# Patient Record
Sex: MALE | Race: WHITE | Employment: OTHER | URBAN - METROPOLITAN AREA
[De-identification: names, ages, dates, MRNs, and addresses within clinical notes are randomized per-mention and may not be internally consistent; named-entity substitution may affect disease eponyms.]

---

## 2017-01-31 PROBLEM — Z47.89 AFTERCARE FOLLOWING SURGERY OF THE MUSCULOSKELETAL SYSTEM: Status: ACTIVE | Noted: 2017-01-31

## 2017-04-07 PROCEDURE — 88108 CYTOPATH CONCENTRATE TECH: CPT | Performed by: UROLOGY

## 2017-06-27 PROBLEM — Z47.89 AFTERCARE FOLLOWING SURGERY OF THE MUSCULOSKELETAL SYSTEM: Status: ACTIVE | Noted: 2017-06-27

## 2017-06-27 PROBLEM — Z47.89 AFTERCARE FOLLOWING SURGERY OF THE MUSCULOSKELETAL SYSTEM: Status: RESOLVED | Noted: 2017-01-31 | Resolved: 2017-06-27

## 2017-08-10 PROBLEM — Z79.01 LONG TERM (CURRENT) USE OF ANTICOAGULANTS: Status: ACTIVE | Noted: 2017-08-10

## 2017-10-27 PROCEDURE — 88108 CYTOPATH CONCENTRATE TECH: CPT | Performed by: UROLOGY

## 2018-03-01 PROBLEM — I25.5 ISCHEMIC CARDIOMYOPATHY: Status: ACTIVE | Noted: 2018-03-01

## 2018-03-01 PROBLEM — Z47.89 AFTERCARE FOLLOWING SURGERY OF THE MUSCULOSKELETAL SYSTEM: Status: RESOLVED | Noted: 2017-06-27 | Resolved: 2018-03-01

## 2018-03-01 PROBLEM — G62.9 PERIPHERAL POLYNEUROPATHY: Status: ACTIVE | Noted: 2018-03-01

## 2018-03-01 PROBLEM — Z86.73 HISTORY OF CVA IN ADULTHOOD: Status: ACTIVE | Noted: 2018-03-01

## 2018-03-01 PROBLEM — J98.2 INTERSTITIAL EMPHYSEMA (HCC): Status: ACTIVE | Noted: 2018-03-01

## 2018-03-01 PROBLEM — I25.5 ISCHEMIC CARDIOMYOPATHY: Status: RESOLVED | Noted: 2018-03-01 | Resolved: 2018-03-01

## 2018-06-01 PROBLEM — I34.0 NON-RHEUMATIC MITRAL REGURGITATION: Status: ACTIVE | Noted: 2018-06-01

## 2018-06-29 PROBLEM — R31.0 GROSS HEMATURIA: Status: ACTIVE | Noted: 2018-06-29

## 2018-08-02 PROBLEM — I47.2 VT (VENTRICULAR TACHYCARDIA) (HCC): Status: ACTIVE | Noted: 2018-08-02

## 2018-08-02 PROBLEM — I47.20 VT (VENTRICULAR TACHYCARDIA): Status: ACTIVE | Noted: 2018-08-02

## 2018-10-22 PROBLEM — L97.909 ATHEROSCLEROSIS OF ARTERY OF EXTREMITY WITH ULCERATION (HCC): Status: ACTIVE | Noted: 2018-10-22

## 2018-10-22 PROBLEM — I70.299 ATHEROSCLEROSIS OF ARTERY OF EXTREMITY WITH ULCERATION (HCC): Status: ACTIVE | Noted: 2018-10-22

## 2019-07-19 PROBLEM — I77.9 BILATERAL CAROTID ARTERY DISEASE (HCC): Status: ACTIVE | Noted: 2019-07-19

## 2019-07-24 PROBLEM — R35.1 BENIGN PROSTATIC HYPERPLASIA WITH NOCTURIA: Status: ACTIVE | Noted: 2019-07-24

## 2019-07-24 PROBLEM — N40.1 BENIGN PROSTATIC HYPERPLASIA WITH NOCTURIA: Status: ACTIVE | Noted: 2019-07-24

## 2019-07-24 PROBLEM — Z89.421 ACQUIRED ABSENCE OF OTHER RIGHT TOE(S) (HCC): Status: ACTIVE | Noted: 2019-07-24

## 2019-07-24 PROBLEM — R31.0 GROSS HEMATURIA: Status: RESOLVED | Noted: 2018-06-29 | Resolved: 2019-07-24

## 2019-09-04 PROBLEM — Z47.89 AFTERCARE FOLLOWING SURGERY OF THE MUSCULOSKELETAL SYSTEM, NEC: Status: ACTIVE | Noted: 2019-09-04

## 2020-01-27 PROBLEM — H25.9 AGE-RELATED CATARACT OF BOTH EYES, UNSPECIFIED AGE-RELATED CATARACT TYPE: Status: ACTIVE | Noted: 2020-01-27

## 2021-06-02 PROBLEM — Z89.421: Status: ACTIVE | Noted: 2019-07-24

## 2021-06-02 PROBLEM — I65.23 BILATERAL CAROTID ARTERY STENOSIS: Status: ACTIVE | Noted: 2019-07-19

## 2021-06-02 PROBLEM — D68.69 SECONDARY HYPERCOAGULABLE STATE (HCC): Status: ACTIVE | Noted: 2021-06-02

## 2021-06-28 RX ORDER — WARFARIN SODIUM 7.5 MG/1
7.5 TABLET ORAL AS DIRECTED
COMMUNITY
End: 2022-01-04

## 2021-07-01 ENCOUNTER — HOSPITAL ENCOUNTER (OUTPATIENT)
Dept: INTERVENTIONAL RADIOLOGY/VASCULAR | Facility: HOSPITAL | Age: 81
Discharge: HOME | End: 2021-07-01
Attending: SURGERY | Admitting: SURGERY
Payer: MEDICARE

## 2021-07-01 VITALS
HEART RATE: 69 BPM | WEIGHT: 175 LBS | TEMPERATURE: 98 F | DIASTOLIC BLOOD PRESSURE: 61 MMHG | BODY MASS INDEX: 23.19 KG/M2 | OXYGEN SATURATION: 99 % | RESPIRATION RATE: 19 BRPM | SYSTOLIC BLOOD PRESSURE: 105 MMHG | HEIGHT: 73 IN

## 2021-07-01 DIAGNOSIS — I70.244: ICD-10-CM

## 2021-07-01 DIAGNOSIS — I70.234: ICD-10-CM

## 2021-07-01 LAB — INR: 1.2 (ref 0.8–1.3)

## 2021-07-01 PROCEDURE — 75710 ARTERY X-RAYS ARM/LEG: CPT

## 2021-07-01 PROCEDURE — 36246 INS CATH ABD/L-EXT ART 2ND: CPT

## 2021-07-01 PROCEDURE — 99152 MOD SED SAME PHYS/QHP 5/>YRS: CPT

## 2021-07-01 PROCEDURE — B41F1ZZ FLUOROSCOPY OF RIGHT LOWER EXTREMITY ARTERIES USING LOW OSMOLAR CONTRAST: ICD-10-PCS | Performed by: SURGERY

## 2021-07-01 PROCEDURE — 99153 MOD SED SAME PHYS/QHP EA: CPT

## 2021-07-01 PROCEDURE — 85610 PROTHROMBIN TIME: CPT

## 2021-07-01 PROCEDURE — 3E053KZ INTRODUCTION OF OTHER DIAGNOSTIC SUBSTANCE INTO PERIPHERAL ARTERY, PERCUTANEOUS APPROACH: ICD-10-PCS | Performed by: SURGERY

## 2021-07-01 RX ORDER — ASPIRIN 81 MG/1
324 TABLET, CHEWABLE ORAL DAILY
Status: DISCONTINUED | OUTPATIENT
Start: 2021-07-01 | End: 2021-07-01

## 2021-07-01 RX ORDER — MIDAZOLAM HYDROCHLORIDE 1 MG/ML
INJECTION INTRAMUSCULAR; INTRAVENOUS
Status: COMPLETED
Start: 2021-07-01 | End: 2021-07-01

## 2021-07-01 RX ORDER — ASPIRIN 81 MG/1
TABLET, CHEWABLE ORAL
Status: COMPLETED
Start: 2021-07-01 | End: 2021-07-01

## 2021-07-01 RX ORDER — ONDANSETRON 4 MG/1
4 TABLET, ORALLY DISINTEGRATING ORAL EVERY 6 HOURS PRN
Status: DISCONTINUED | OUTPATIENT
Start: 2021-07-01 | End: 2021-07-01

## 2021-07-01 RX ORDER — HEPARIN SODIUM 5000 [USP'U]/ML
INJECTION, SOLUTION INTRAVENOUS; SUBCUTANEOUS
Status: COMPLETED
Start: 2021-07-01 | End: 2021-07-01

## 2021-07-01 RX ORDER — ONDANSETRON 2 MG/ML
4 INJECTION INTRAMUSCULAR; INTRAVENOUS EVERY 6 HOURS PRN
Status: DISCONTINUED | OUTPATIENT
Start: 2021-07-01 | End: 2021-07-01

## 2021-07-01 RX ORDER — ONDANSETRON 2 MG/ML
INJECTION INTRAMUSCULAR; INTRAVENOUS
Status: COMPLETED
Start: 2021-07-01 | End: 2021-07-01

## 2021-07-01 RX ORDER — SODIUM CHLORIDE 9 MG/ML
INJECTION, SOLUTION INTRAVENOUS CONTINUOUS
Status: DISCONTINUED | OUTPATIENT
Start: 2021-07-01 | End: 2021-07-01

## 2021-07-01 RX ORDER — LIDOCAINE HYDROCHLORIDE 10 MG/ML
INJECTION, SOLUTION EPIDURAL; INFILTRATION; INTRACAUDAL; PERINEURAL
Status: COMPLETED
Start: 2021-07-01 | End: 2021-07-01

## 2021-07-01 RX ADMIN — SODIUM CHLORIDE: 9 INJECTION, SOLUTION INTRAVENOUS at 10:00:00

## 2021-07-01 RX ADMIN — ASPIRIN 324 MG: 81 TABLET, CHEWABLE ORAL at 10:00:00

## 2021-07-01 NOTE — PROGRESS NOTES
Pt is able to sit up and ambulate without difficulty. Pt's vital signs are stable. Procedural access site is dry and intact with no signs and symptoms of bleeding or hematoma. Pt tolerated food/fluids. Dr. Shireen Retana spoke to pt/family post procedure.  Instruct

## 2021-07-01 NOTE — H&P
[facility]  Vascular Surgery Consultation    Lopez Raiza Patient Status:  Outpatient in a Bed    1940 MRN [id number]   Location [address] [facility] Attending Maya Trujillo MD   Hosp Day # 0 PCP Eliceo Gross MD Location: [address] [place] [address]   • INCISION FLEX TOE TENDON Left 5/12/2014    Procedure: ABDUCTOR HALLUCIS BREVIS TENDON TRANSFER/LENGTHENING;  Surgeon: Bogdan Flannery DPM;  Location: [facility] (NOT [facility]) [facility]   • REPAIR OF HAMMERTOE,ONE Left 5/12/2014    Procedure: METATARSAL HEAD RESECTION;  Surgeon: Linda Avery DPM;  Location: [address] [place] [address] Left 5/12/2014    Procedure: ARTHROPLASTY ONE (1) TOE;  Surgeon: Anthony Magana murmur, no gallop   ABDOMEN: soft, non-tender with no palpable aneurysm or masses  BACK: normal, no tenderness  SKIN: no rashes, no suspicious lesions, warm and dry  EXTREMITIES: no edema, full range of motion, no tenderness  NEURO/PSYCH: orientated x3, no

## 2021-07-01 NOTE — BRIEF OP NOTE
Pre-Operative Diagnosis: Atherosclerosis with ulcer right foot     Post-Operative Diagnosis: same     Procedure Performed:   1. US perc access left common femoral artery  2.  Selection of right common femoral artery and angiogram right leg    Anes:   2 V 25

## 2021-07-02 NOTE — PROGRESS NOTES
BETO roman, NPO after midnight, take only carvedilol, digoxin and entresto the morning of surgery with a tiny sip of water. Last dose of coumadin was several days ago because he had an angiogram yesterday on 7-1.   Instructed to continue to hold coumadin for

## 2021-07-03 NOTE — PROCEDURES
[facility]    PATIENT'S NAME: Hemalatha Barron   ATTENDING PHYSICIAN: Lindsey Castillo M.D. OPERATING PHYSICIAN: Lindsey Castillo M.D.    PATIENT ACCOUNT#:   [de-identified]    LOCATION:  [place] [address] ED  MEDICAL RECORD #:   [id number] diagnostic angiogram to attempt to plan revascularization. DETAILS OF PROCEDURE:  Patient was taken to the catheterization lab, prepped and draped in a sterile fashion.   Moderate conscious sedation was initiated and monitored by a qualified nurse under 07:27:44  t: 07/02/2021 08:08:27  [facility] [id number]  [facility]/

## 2021-07-05 ENCOUNTER — ANESTHESIA EVENT (OUTPATIENT)
Dept: CARDIAC SURGERY | Facility: HOSPITAL | Age: 81
DRG: 253 | End: 2021-07-05
Payer: MEDICARE

## 2021-07-05 NOTE — ANESTHESIA PREPROCEDURE EVALUATION
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  TAKE 1 TABLET DAILY ), Disp: 90 tablet, Rfl: 3, 7/6/2021 at Unknown time  carvedilol 25 MG Oral Tab, Take 1 tablet (25 mg total) by mouth 2 (two) History:   Procedure Laterality Date   • ADJ TISS XFER HEAD,FAC,HAND <10SQCM Left 5/12/2014    Procedure: TENOTOMY AND CAPSULOTOMY FOOT;  Surgeon: Pam Gonzalez DPM;  Location: [address] [place] [address]   • ADJ TISS XFER HEAD,FAC,HAND <10SQCM Left 5/12/2 ANTIBIOTIC SURGICAL SITE INFECT Left 5/12/2014    Procedure: TENOTOMY AND CAPSULOTOMY FOOT;  Surgeon: Mckinley Lewis DPM;  Location: [address] [place] [address]   • PATIENT WITH PREOPERATIVE ORDER FOR IV ANTIBIOTIC SURGICAL SITE INFECT Right 2/22/2016    Pr Cardiovascular    Cardiovascular exam normal.  Rhythm: regular  Rate: normal  (+) murmur   Dental    No notable dental history. Pulmonary    Pulmonary exam normal.  Breath sounds clear to auscultation bilaterally.                Other findings

## 2021-07-06 ENCOUNTER — HOSPITAL ENCOUNTER (INPATIENT)
Facility: HOSPITAL | Age: 81
LOS: 6 days | Discharge: HOME | DRG: 253 | End: 2021-07-12
Attending: SURGERY | Admitting: SURGERY
Payer: MEDICARE

## 2021-07-06 ENCOUNTER — ANESTHESIA (OUTPATIENT)
Dept: CARDIAC SURGERY | Facility: HOSPITAL | Age: 81
DRG: 253 | End: 2021-07-06
Payer: MEDICARE

## 2021-07-06 LAB
ANION GAP SERPL CALC-SCNC: 6 MMOL/L (ref 0–18)
ANTIBODY SCREEN: NEGATIVE
APTT PPP: 36 SECONDS (ref 25.4–36.1)
APTT PPP: 36.6 SECONDS (ref 25.4–36.1)
ATRIAL RATE: 97 BPM
BUN BLD-MCNC: 14 MG/DL (ref 7–18)
BUN/CREAT SERPL: 15.6 (ref 10–20)
CALCIUM BLD-MCNC: 7.7 MG/DL (ref 8.5–10.1)
CHLORIDE SERPL-SCNC: 112 MMOL/L (ref 98–112)
CO2 SERPL-SCNC: 22 MMOL/L (ref 21–32)
CREAT BLD-MCNC: 0.9 MG/DL
DEPRECATED RDW RBC AUTO: 46.7 FL (ref 35.1–46.3)
ERYTHROCYTE [DISTWIDTH] IN BLOOD BY AUTOMATED COUNT: 14.1 % (ref 11–15)
GLUCOSE BLD-MCNC: 172 MG/DL (ref 70–99)
GLUCOSE BLD-MCNC: 176 MG/DL (ref 70–99)
GLUCOSE BLD-MCNC: 99 MG/DL (ref 70–99)
HAV IGM SER QL: 1.9 MG/DL (ref 1.6–2.6)
HCT VFR BLD AUTO: 41.2 %
HGB BLD-MCNC: 13.4 G/DL
INR BLD: 1.11 (ref 0.89–1.11)
INR BLD: 1.17 (ref 0.89–1.11)
ISTAT ACTIVATED CLOTTING TIME: 125 SECONDS (ref 74–137)
ISTAT BLOOD GAS BASE EXCESS: -1 MMOL/L
ISTAT BLOOD GAS BASE EXCESS: -2 MMOL/L
ISTAT BLOOD GAS HCO3: 23.4 MEQ/L
ISTAT BLOOD GAS HCO3: 24.6 MEQ/L
ISTAT BLOOD GAS O2 SATURATION: 100 %
ISTAT BLOOD GAS O2 SATURATION: 100 %
ISTAT BLOOD GAS PCO2: 41.6 MMHG
ISTAT BLOOD GAS PCO2: 42.1 MMHG
ISTAT BLOOD GAS PH: 7.36
ISTAT BLOOD GAS PH: 7.38
ISTAT BLOOD GAS PO2: 233 MMHG
ISTAT BLOOD GAS PO2: 314 MMHG
ISTAT BLOOD GAS TCO2: 25 MMOL/L (ref 22–32)
ISTAT BLOOD GAS TCO2: 26 MMOL/L (ref 22–32)
ISTAT HEMATOCRIT: 34 %
ISTAT HEMATOCRIT: 42 %
ISTAT IONIZED CALCIUM: 1.11 MMOL/L (ref 1.12–1.32)
ISTAT IONIZED CALCIUM: 1.13 MMOL/L (ref 1.12–1.32)
ISTAT POTASSIUM: 3.5 MMOL/L (ref 3.6–5.1)
ISTAT POTASSIUM: 4.5 MMOL/L (ref 3.6–5.1)
ISTAT SODIUM: 140 MMOL/L (ref 136–145)
ISTAT SODIUM: 142 MMOL/L (ref 136–145)
MCH RBC QN AUTO: 29.3 PG (ref 26–34)
MCHC RBC AUTO-ENTMCNC: 32.5 G/DL (ref 31–37)
MCV RBC AUTO: 90.2 FL
OSMOLALITY SERPL CALC.SUM OF ELEC: 295 MOSM/KG (ref 275–295)
PLATELET # BLD AUTO: 224 10(3)UL (ref 150–450)
POTASSIUM SERPL-SCNC: 4.1 MMOL/L (ref 3.5–5.1)
PSA SERPL DL<=0.01 NG/ML-MCNC: 14.6 SECONDS (ref 12.4–14.6)
PSA SERPL DL<=0.01 NG/ML-MCNC: 15.3 SECONDS (ref 12.4–14.6)
Q-T INTERVAL: 522 MS
QRS DURATION: 212 MS
QTC CALCULATION (BEZET): 567 MS
R AXIS: 227 DEGREES
RBC # BLD AUTO: 4.57 X10(6)UL
RH BLOOD TYPE: POSITIVE
SODIUM SERPL-SCNC: 140 MMOL/L (ref 136–145)
T AXIS: 63 DEGREES
TSI SER-ACNC: 0.64 MIU/ML (ref 0.36–3.74)
VENTRICULAR RATE: 71 BPM
WBC # BLD AUTO: 10.7 X10(3) UL (ref 4–11)

## 2021-07-06 PROCEDURE — 86920 COMPATIBILITY TEST SPIN: CPT

## 2021-07-06 PROCEDURE — 85347 COAGULATION TIME ACTIVATED: CPT

## 2021-07-06 PROCEDURE — 80048 BASIC METABOLIC PNL TOTAL CA: CPT | Performed by: INTERNAL MEDICINE

## 2021-07-06 PROCEDURE — 82330 ASSAY OF CALCIUM: CPT

## 2021-07-06 PROCEDURE — 93010 ELECTROCARDIOGRAM REPORT: CPT | Performed by: INTERNAL MEDICINE

## 2021-07-06 PROCEDURE — 84132 ASSAY OF SERUM POTASSIUM: CPT

## 2021-07-06 PROCEDURE — 85730 THROMBOPLASTIN TIME PARTIAL: CPT | Performed by: SURGERY

## 2021-07-06 PROCEDURE — 85610 PROTHROMBIN TIME: CPT | Performed by: SURGERY

## 2021-07-06 PROCEDURE — 85014 HEMATOCRIT: CPT

## 2021-07-06 PROCEDURE — 86900 BLOOD TYPING SEROLOGIC ABO: CPT | Performed by: SURGERY

## 2021-07-06 PROCEDURE — 85027 COMPLETE CBC AUTOMATED: CPT | Performed by: SURGERY

## 2021-07-06 PROCEDURE — 84295 ASSAY OF SERUM SODIUM: CPT

## 2021-07-06 PROCEDURE — 86901 BLOOD TYPING SEROLOGIC RH(D): CPT | Performed by: SURGERY

## 2021-07-06 PROCEDURE — 83735 ASSAY OF MAGNESIUM: CPT | Performed by: INTERNAL MEDICINE

## 2021-07-06 PROCEDURE — 86850 RBC ANTIBODY SCREEN: CPT | Performed by: SURGERY

## 2021-07-06 PROCEDURE — 041K09M BYPASS RIGHT FEMORAL ARTERY TO PERONEAL ARTERY WITH AUTOLOGOUS VENOUS TISSUE, OPEN APPROACH: ICD-10-PCS | Performed by: SURGERY

## 2021-07-06 PROCEDURE — 93005 ELECTROCARDIOGRAM TRACING: CPT

## 2021-07-06 PROCEDURE — 84443 ASSAY THYROID STIM HORMONE: CPT | Performed by: INTERNAL MEDICINE

## 2021-07-06 PROCEDURE — 06BQ0ZZ EXCISION OF LEFT SAPHENOUS VEIN, OPEN APPROACH: ICD-10-PCS | Performed by: SURGERY

## 2021-07-06 PROCEDURE — 3E033XZ INTRODUCTION OF VASOPRESSOR INTO PERIPHERAL VEIN, PERCUTANEOUS APPROACH: ICD-10-PCS | Performed by: INTERNAL MEDICINE

## 2021-07-06 PROCEDURE — 82803 BLOOD GASES ANY COMBINATION: CPT

## 2021-07-06 RX ORDER — NALOXONE HYDROCHLORIDE 0.4 MG/ML
80 INJECTION, SOLUTION INTRAMUSCULAR; INTRAVENOUS; SUBCUTANEOUS AS NEEDED
Status: ACTIVE | OUTPATIENT
Start: 2021-07-06 | End: 2021-07-06

## 2021-07-06 RX ORDER — DEXAMETHASONE SODIUM PHOSPHATE 4 MG/ML
VIAL (ML) INJECTION AS NEEDED
Status: DISCONTINUED | OUTPATIENT
Start: 2021-07-06 | End: 2021-07-06 | Stop reason: SURG

## 2021-07-06 RX ORDER — HYDROMORPHONE HYDROCHLORIDE 1 MG/ML
0.2 INJECTION, SOLUTION INTRAMUSCULAR; INTRAVENOUS; SUBCUTANEOUS EVERY 2 HOUR PRN
Status: DISCONTINUED | OUTPATIENT
Start: 2021-07-06 | End: 2021-07-12

## 2021-07-06 RX ORDER — ASPIRIN 81 MG/1
81 TABLET ORAL DAILY
Status: DISCONTINUED | OUTPATIENT
Start: 2021-07-06 | End: 2021-07-12

## 2021-07-06 RX ORDER — PHENYLEPHRINE HCL IN 0.9% NACL 50MG/250ML
PLASTIC BAG, INJECTION (ML) INTRAVENOUS CONTINUOUS
Status: DISCONTINUED | OUTPATIENT
Start: 2021-07-06 | End: 2021-07-09

## 2021-07-06 RX ORDER — FAMOTIDINE 20 MG/1
20 TABLET ORAL 2 TIMES DAILY
Status: DISCONTINUED | OUTPATIENT
Start: 2021-07-06 | End: 2021-07-12

## 2021-07-06 RX ORDER — CEFAZOLIN SODIUM/WATER 2 G/20 ML
2 SYRINGE (ML) INTRAVENOUS EVERY 8 HOURS
Status: COMPLETED | OUTPATIENT
Start: 2021-07-06 | End: 2021-07-07

## 2021-07-06 RX ORDER — ONDANSETRON 2 MG/ML
4 INJECTION INTRAMUSCULAR; INTRAVENOUS EVERY 6 HOURS PRN
Status: DISCONTINUED | OUTPATIENT
Start: 2021-07-06 | End: 2021-07-12

## 2021-07-06 RX ORDER — NEOSTIGMINE METHYLSULFATE 1 MG/ML
INJECTION INTRAVENOUS AS NEEDED
Status: DISCONTINUED | OUTPATIENT
Start: 2021-07-06 | End: 2021-07-06 | Stop reason: SURG

## 2021-07-06 RX ORDER — ONDANSETRON 2 MG/ML
4 INJECTION INTRAMUSCULAR; INTRAVENOUS ONCE AS NEEDED
Status: ACTIVE | OUTPATIENT
Start: 2021-07-06 | End: 2021-07-06

## 2021-07-06 RX ORDER — TAMSULOSIN HYDROCHLORIDE 0.4 MG/1
0.4 CAPSULE ORAL DAILY
Status: DISCONTINUED | OUTPATIENT
Start: 2021-07-06 | End: 2021-07-12

## 2021-07-06 RX ORDER — HYDROMORPHONE HYDROCHLORIDE 1 MG/ML
0.4 INJECTION, SOLUTION INTRAMUSCULAR; INTRAVENOUS; SUBCUTANEOUS EVERY 2 HOUR PRN
Status: DISCONTINUED | OUTPATIENT
Start: 2021-07-06 | End: 2021-07-12

## 2021-07-06 RX ORDER — HYDROCODONE BITARTRATE AND ACETAMINOPHEN 10; 325 MG/1; MG/1
1 TABLET ORAL EVERY 6 HOURS PRN
Status: DISCONTINUED | OUTPATIENT
Start: 2021-07-06 | End: 2021-07-12

## 2021-07-06 RX ORDER — BUMETANIDE 1 MG/1
0.5 TABLET ORAL EVERY OTHER DAY
Status: DISCONTINUED | OUTPATIENT
Start: 2021-07-06 | End: 2021-07-12

## 2021-07-06 RX ORDER — HEPARIN SODIUM 1000 [USP'U]/ML
INJECTION, SOLUTION INTRAVENOUS; SUBCUTANEOUS AS NEEDED
Status: DISCONTINUED | OUTPATIENT
Start: 2021-07-06 | End: 2021-07-06 | Stop reason: SURG

## 2021-07-06 RX ORDER — HYDROMORPHONE HYDROCHLORIDE 1 MG/ML
0.8 INJECTION, SOLUTION INTRAMUSCULAR; INTRAVENOUS; SUBCUTANEOUS EVERY 2 HOUR PRN
Status: DISCONTINUED | OUTPATIENT
Start: 2021-07-06 | End: 2021-07-12

## 2021-07-06 RX ORDER — SODIUM CHLORIDE, SODIUM LACTATE, POTASSIUM CHLORIDE, CALCIUM CHLORIDE 600; 310; 30; 20 MG/100ML; MG/100ML; MG/100ML; MG/100ML
INJECTION, SOLUTION INTRAVENOUS CONTINUOUS PRN
Status: DISCONTINUED | OUTPATIENT
Start: 2021-07-06 | End: 2021-07-06 | Stop reason: SURG

## 2021-07-06 RX ORDER — SODIUM CHLORIDE, SODIUM LACTATE, POTASSIUM CHLORIDE, CALCIUM CHLORIDE 600; 310; 30; 20 MG/100ML; MG/100ML; MG/100ML; MG/100ML
INJECTION, SOLUTION INTRAVENOUS CONTINUOUS
Status: DISCONTINUED | OUTPATIENT
Start: 2021-07-06 | End: 2021-07-09

## 2021-07-06 RX ORDER — SODIUM CHLORIDE 9 MG/ML
INJECTION, SOLUTION INTRAVENOUS CONTINUOUS
Status: DISCONTINUED | OUTPATIENT
Start: 2021-07-06 | End: 2021-07-08

## 2021-07-06 RX ORDER — ROCURONIUM BROMIDE 10 MG/ML
INJECTION, SOLUTION INTRAVENOUS AS NEEDED
Status: DISCONTINUED | OUTPATIENT
Start: 2021-07-06 | End: 2021-07-06 | Stop reason: SURG

## 2021-07-06 RX ORDER — HYDROCODONE BITARTRATE AND ACETAMINOPHEN 10; 325 MG/1; MG/1
2 TABLET ORAL EVERY 6 HOURS PRN
Status: DISCONTINUED | OUTPATIENT
Start: 2021-07-06 | End: 2021-07-12

## 2021-07-06 RX ORDER — ONDANSETRON 2 MG/ML
INJECTION INTRAMUSCULAR; INTRAVENOUS AS NEEDED
Status: DISCONTINUED | OUTPATIENT
Start: 2021-07-06 | End: 2021-07-06 | Stop reason: SURG

## 2021-07-06 RX ORDER — MIDAZOLAM HYDROCHLORIDE 1 MG/ML
INJECTION INTRAMUSCULAR; INTRAVENOUS AS NEEDED
Status: DISCONTINUED | OUTPATIENT
Start: 2021-07-06 | End: 2021-07-06 | Stop reason: SURG

## 2021-07-06 RX ORDER — MORPHINE SULFATE 2 MG/ML
2 INJECTION, SOLUTION INTRAMUSCULAR; INTRAVENOUS EVERY 10 MIN PRN
Status: ACTIVE | OUTPATIENT
Start: 2021-07-06 | End: 2021-07-06

## 2021-07-06 RX ORDER — DOBUTAMINE HYDROCHLORIDE 200 MG/100ML
INJECTION INTRAVENOUS CONTINUOUS
Status: DISCONTINUED | OUTPATIENT
Start: 2021-07-06 | End: 2021-07-09

## 2021-07-06 RX ORDER — CARVEDILOL 12.5 MG/1
25 TABLET ORAL 2 TIMES DAILY WITH MEALS
Status: DISCONTINUED | OUTPATIENT
Start: 2021-07-06 | End: 2021-07-07

## 2021-07-06 RX ORDER — LIDOCAINE HYDROCHLORIDE 10 MG/ML
INJECTION, SOLUTION EPIDURAL; INFILTRATION; INTRACAUDAL; PERINEURAL AS NEEDED
Status: DISCONTINUED | OUTPATIENT
Start: 2021-07-06 | End: 2021-07-06 | Stop reason: SURG

## 2021-07-06 RX ORDER — SODIUM CHLORIDE 9 MG/ML
INJECTION, SOLUTION INTRAVENOUS CONTINUOUS PRN
Status: DISCONTINUED | OUTPATIENT
Start: 2021-07-06 | End: 2021-07-06 | Stop reason: SURG

## 2021-07-06 RX ORDER — GLYCOPYRROLATE 0.2 MG/ML
INJECTION, SOLUTION INTRAMUSCULAR; INTRAVENOUS AS NEEDED
Status: DISCONTINUED | OUTPATIENT
Start: 2021-07-06 | End: 2021-07-06 | Stop reason: SURG

## 2021-07-06 RX ORDER — PROTAMINE SULFATE 10 MG/ML
INJECTION, SOLUTION INTRAVENOUS AS NEEDED
Status: DISCONTINUED | OUTPATIENT
Start: 2021-07-06 | End: 2021-07-06 | Stop reason: SURG

## 2021-07-06 RX ORDER — DIGOXIN 125 MCG
125 TABLET ORAL DAILY
Status: DISCONTINUED | OUTPATIENT
Start: 2021-07-07 | End: 2021-07-12

## 2021-07-06 RX ORDER — CEFAZOLIN SODIUM/WATER 2 G/20 ML
SYRINGE (ML) INTRAVENOUS AS NEEDED
Status: DISCONTINUED | OUTPATIENT
Start: 2021-07-06 | End: 2021-07-06 | Stop reason: SURG

## 2021-07-06 RX ORDER — FAMOTIDINE 10 MG/ML
20 INJECTION, SOLUTION INTRAVENOUS 2 TIMES DAILY
Status: DISCONTINUED | OUTPATIENT
Start: 2021-07-06 | End: 2021-07-12

## 2021-07-06 RX ORDER — HEPARIN SODIUM 5000 [USP'U]/ML
5000 INJECTION, SOLUTION INTRAVENOUS; SUBCUTANEOUS ONCE
Status: DISCONTINUED | OUTPATIENT
Start: 2021-07-06 | End: 2021-07-06 | Stop reason: HOSPADM

## 2021-07-06 RX ORDER — DOBUTAMINE HYDROCHLORIDE 200 MG/100ML
INJECTION INTRAVENOUS CONTINUOUS PRN
Status: DISCONTINUED | OUTPATIENT
Start: 2021-07-06 | End: 2021-07-06 | Stop reason: SURG

## 2021-07-06 RX ADMIN — SODIUM CHLORIDE, SODIUM LACTATE, POTASSIUM CHLORIDE, CALCIUM CHLORIDE: 600; 310; 30; 20 INJECTION, SOLUTION INTRAVENOUS at 13:10:00

## 2021-07-06 RX ADMIN — HEPARIN SODIUM 9000 UNITS: 1000 INJECTION, SOLUTION INTRAVENOUS; SUBCUTANEOUS at 10:39:00

## 2021-07-06 RX ADMIN — PROTAMINE SULFATE 50 MG: 10 INJECTION, SOLUTION INTRAVENOUS at 12:05:00

## 2021-07-06 RX ADMIN — NEOSTIGMINE METHYLSULFATE 3 MG: 1 INJECTION INTRAVENOUS at 13:24:00

## 2021-07-06 RX ADMIN — DOBUTAMINE HYDROCHLORIDE 2 MCG/KG/MIN: 200 INJECTION INTRAVENOUS at 13:33:00

## 2021-07-06 RX ADMIN — SODIUM CHLORIDE, SODIUM LACTATE, POTASSIUM CHLORIDE, CALCIUM CHLORIDE: 600; 310; 30; 20 INJECTION, SOLUTION INTRAVENOUS at 12:18:00

## 2021-07-06 RX ADMIN — SODIUM CHLORIDE, SODIUM LACTATE, POTASSIUM CHLORIDE, CALCIUM CHLORIDE: 600; 310; 30; 20 INJECTION, SOLUTION INTRAVENOUS at 09:51:00

## 2021-07-06 RX ADMIN — SODIUM CHLORIDE: 9 INJECTION, SOLUTION INTRAVENOUS at 12:17:00

## 2021-07-06 RX ADMIN — ROCURONIUM BROMIDE 10 MG: 10 INJECTION, SOLUTION INTRAVENOUS at 09:00:00

## 2021-07-06 RX ADMIN — CEFAZOLIN SODIUM/WATER 2 G: 2 G/20 ML SYRINGE (ML) INTRAVENOUS at 11:09:00

## 2021-07-06 RX ADMIN — DEXAMETHASONE SODIUM PHOSPHATE 4 MG: 4 MG/ML VIAL (ML) INJECTION at 07:20:00

## 2021-07-06 RX ADMIN — ROCURONIUM BROMIDE 10 MG: 10 INJECTION, SOLUTION INTRAVENOUS at 07:44:00

## 2021-07-06 RX ADMIN — SODIUM CHLORIDE: 9 INJECTION, SOLUTION INTRAVENOUS at 06:59:00

## 2021-07-06 RX ADMIN — SODIUM CHLORIDE: 9 INJECTION, SOLUTION INTRAVENOUS at 11:07:00

## 2021-07-06 RX ADMIN — HEPARIN SODIUM 1000 UNITS: 1000 INJECTION, SOLUTION INTRAVENOUS; SUBCUTANEOUS at 11:49:00

## 2021-07-06 RX ADMIN — ROCURONIUM BROMIDE 10 MG: 10 INJECTION, SOLUTION INTRAVENOUS at 10:40:00

## 2021-07-06 RX ADMIN — MIDAZOLAM HYDROCHLORIDE 2 MG: 1 INJECTION INTRAMUSCULAR; INTRAVENOUS at 07:01:00

## 2021-07-06 RX ADMIN — SODIUM CHLORIDE: 9 INJECTION, SOLUTION INTRAVENOUS at 08:31:00

## 2021-07-06 RX ADMIN — SODIUM CHLORIDE, SODIUM LACTATE, POTASSIUM CHLORIDE, CALCIUM CHLORIDE: 600; 310; 30; 20 INJECTION, SOLUTION INTRAVENOUS at 11:06:00

## 2021-07-06 RX ADMIN — ONDANSETRON 4 MG: 2 INJECTION INTRAMUSCULAR; INTRAVENOUS at 07:20:00

## 2021-07-06 RX ADMIN — CEFAZOLIN SODIUM/WATER 2 G: 2 G/20 ML SYRINGE (ML) INTRAVENOUS at 07:15:00

## 2021-07-06 RX ADMIN — DOBUTAMINE HYDROCHLORIDE 3 MCG/KG/MIN: 200 INJECTION INTRAVENOUS at 07:07:00

## 2021-07-06 RX ADMIN — LIDOCAINE HYDROCHLORIDE 50 MG: 10 INJECTION, SOLUTION EPIDURAL; INFILTRATION; INTRACAUDAL; PERINEURAL at 07:07:00

## 2021-07-06 RX ADMIN — SODIUM CHLORIDE, SODIUM LACTATE, POTASSIUM CHLORIDE, CALCIUM CHLORIDE: 600; 310; 30; 20 INJECTION, SOLUTION INTRAVENOUS at 07:15:00

## 2021-07-06 RX ADMIN — ROCURONIUM BROMIDE 30 MG: 10 INJECTION, SOLUTION INTRAVENOUS at 07:07:00

## 2021-07-06 RX ADMIN — SODIUM CHLORIDE: 9 INJECTION, SOLUTION INTRAVENOUS at 09:50:00

## 2021-07-06 RX ADMIN — SODIUM CHLORIDE, SODIUM LACTATE, POTASSIUM CHLORIDE, CALCIUM CHLORIDE: 600; 310; 30; 20 INJECTION, SOLUTION INTRAVENOUS at 08:31:00

## 2021-07-06 RX ADMIN — GLYCOPYRROLATE 0.6 MG: 0.2 INJECTION, SOLUTION INTRAMUSCULAR; INTRAVENOUS at 13:24:00

## 2021-07-06 NOTE — CONSULTS
[facility] Cardiology  Report of Consultation    Bakari Gamino Patient Status:  Inpatient    1940 MRN [id number]   [facility] 6NE-A Attending Mykel Rodriguez MD   Hosp Day # 0 PCP Charlene Ashby MD     [facility] for [facility] SERVICES, [facility] ([facility]) premature arthrosclerotic heart disease     Medications:   Scheduled:   • tamsulosin HCl  0.4 mg Oral Daily   • bumetanide  0.5 mg Oral QOD   • carvedilol  25 mg Oral BID with meals   • [START ON 7/7/2021] digoxin  125 mcg Oral Daily   • Sacubitril-Valsart Systems:   No fevers, chills, change in weight or bowel habits. Ten point review of systems is otherwise negative or unremarkable.     Physical Exam:    07/06/21  1445   BP: 135/78   Pulse: 85   Resp: 23   Temp:      Wt Readings from Last 3 Encounters:  07    fraction is 15-20%. Wall motion is normal; there are no regional wall      motion abnormalities. 2. Aortic valve: Trileaflet; mildly thickened, mildly calcified leaflets.      There is mild regurgitation. 3. Mitral valve: Mildly calcified annulus.

## 2021-07-06 NOTE — BRIEF OP NOTE
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

## 2021-07-06 NOTE — ANESTHESIA POSTPROCEDURE EVALUATION
[address] [facility] Patient Status:  Inpatient   Age/Gender [de-identified]year old male MRN [id number]   [facility] 6NE-A Attending John Sher MD   Hosp Day # 0 PCP Nikki Kent MD       Anesthesia Post-op Note    RIGHT FEMORAL A

## 2021-07-06 NOTE — CONSULTS
General Medicine Consult      Reason for consult:  Post op medical management     Consulted by: Dr. Chang Elizondo     PCP: Indio Dowd MD      History of Present Illness: Patient is a [de-identified]year old male with PMH sig for PAD with non-healing toe lesion, non-isch MT-CECILIO HOPKINS,JAMARCUS,EACH  1/6/2012    Performed by Baltazar Jones at [address]     • JUAN ROMAN/IVA/BELÉN  1/6/2012    Performed by Baltazar Jones at [place] [address] Genesis Bazan;  Location: [address] [place] [address]   • RELEASE OF BIG TOE TENDN Left 5/12/2014    Procedure: TENOTOMY AND CAPSULOTOMY FOOT;  Surgeon: Linda Avery DPM;  Location: [address] [place] [address]  1/6/2012    Performed Tab, Take one tablet (5 mg total) on Mondays, Wednesdays, Fridays, and one and a half tablets (7.5 mg total) all other four days of the week UNLESS  Elvie Rivas INSTRUCTS YOU OTHERWISE (Patient taking differently: Take 5 mg by mouth nightly.  Take on distress, alert and oriented x3, no focal neurologic deficits  HEENT:  EOMI, PERRLA, OP clear, MMM  Pulm: Lungs clear bilaterally, normal respiratory effort  CV: Heart with regular rate and rhythm, no murmur. Normal PMI.     Abd: Abdomen soft, nontender, n imaging demonstrates a patent mid/distal aorta to bilateral   common iliac artery endovascular stent graft with normal velocities   throughout and no evidence of an endoleak.  No significant change compared   to the exam done on 10/1/18. - Right: Moderate a !--------!----------!-----!---------------! +--------------+--------+----------+-----+---------------+ ! R PT          !31mm Hg ! Monophasic!-----!---------------! +--------------+--------+----------+-----+---------------+ ! R DP          ! 55mm Hg ! Monophasic Continuous wave Doppler, pressure measurement, and ankle-brachial index. Location:  Vascular laboratory. Patient status:  Outpatient.  Electronically signed by: Héctor Leiva MD [id number]     [facility] (QVL=[id number])    Result Date: 6 iliac   !54cm/s ! +--------------------+-------+ ! Left external iliac !55cm/s ! +--------------------+-------+ ! Left common femoral !75cm/s ! +--------------------+-------+ Artery mapping: +------------------+--------+----------+----------------------+ ! Lo Arterial Duplex Patient: Miguel Quiroz Date: 2021 7:29AM :     1940 (80yrs)          Accession#: [id number] MRN:     [id number] Gender:  M Ordering Phys: Mahsa Yuan MD Reading Phys:  Mahsa Yuan MD Technologist +------------------------------------+---------+--------+--------+ ! Location                            ! V sys    ! V ed    ! Stenosis! +------------------------------------+---------+--------+--------+ ! Right common femoral - mid          !76.2cm/s !------- superficial femoral - mid      !-36.9cm/s!--------!--------! +------------------------------------+---------+--------+--------+ ! Left superficial femoral - distal   !0cm/s    !--------! Occluded!  +------------------------------------+---------+--------+---- HF (EF=15%), s/p ICD, CVA, chronic a-fib, and HLD who presents s/p R SFA to peroneal artery bypass with L great saphenous vein-redo bypass with Dr. Ricky Blevins.     # Severe PAD  · S/p R SFA to peroneal artery bypass with L great saphenous vein-redo bypass   · P

## 2021-07-07 ENCOUNTER — APPOINTMENT (OUTPATIENT)
Dept: GENERAL RADIOLOGY | Facility: HOSPITAL | Age: 81
DRG: 253 | End: 2021-07-07
Attending: PODIATRIST
Payer: MEDICARE

## 2021-07-07 LAB
ANION GAP SERPL CALC-SCNC: 4 MMOL/L (ref 0–18)
ATRIAL RATE: 88 BPM
BUN BLD-MCNC: 13 MG/DL (ref 7–18)
BUN/CREAT SERPL: 14.1 (ref 10–20)
CALCIUM BLD-MCNC: 7.6 MG/DL (ref 8.5–10.1)
CHLORIDE SERPL-SCNC: 111 MMOL/L (ref 98–112)
CO2 SERPL-SCNC: 26 MMOL/L (ref 21–32)
CREAT BLD-MCNC: 0.92 MG/DL
DEPRECATED RDW RBC AUTO: 46.1 FL (ref 35.1–46.3)
DIGOXIN SERPL-MCNC: 0.44 NG/ML (ref 0.8–2)
ERYTHROCYTE [DISTWIDTH] IN BLOOD BY AUTOMATED COUNT: 14.3 % (ref 11–15)
GLUCOSE BLD-MCNC: 98 MG/DL (ref 70–99)
HCT VFR BLD AUTO: 34.9 %
HGB BLD-MCNC: 11.9 G/DL
MCH RBC QN AUTO: 30.4 PG (ref 26–34)
MCHC RBC AUTO-ENTMCNC: 34.1 G/DL (ref 31–37)
MCV RBC AUTO: 89 FL
OSMOLALITY SERPL CALC.SUM OF ELEC: 292 MOSM/KG (ref 275–295)
PLATELET # BLD AUTO: 191 10(3)UL (ref 150–450)
POTASSIUM SERPL-SCNC: 3.9 MMOL/L (ref 3.5–5.1)
Q-T INTERVAL: 448 MS
QRS DURATION: 158 MS
QTC CALCULATION (BEZET): 504 MS
R AXIS: -29 DEGREES
RBC # BLD AUTO: 3.92 X10(6)UL
SODIUM SERPL-SCNC: 141 MMOL/L (ref 136–145)
T AXIS: 140 DEGREES
VENTRICULAR RATE: 76 BPM
WBC # BLD AUTO: 8.4 X10(3) UL (ref 4–11)

## 2021-07-07 PROCEDURE — 97116 GAIT TRAINING THERAPY: CPT

## 2021-07-07 PROCEDURE — 73620 X-RAY EXAM OF FOOT: CPT | Performed by: PODIATRIST

## 2021-07-07 PROCEDURE — 4B02XTZ MEASUREMENT OF CARDIAC DEFIBRILLATOR, EXTERNAL APPROACH: ICD-10-PCS | Performed by: INTERNAL MEDICINE

## 2021-07-07 PROCEDURE — 97162 PT EVAL MOD COMPLEX 30 MIN: CPT

## 2021-07-07 PROCEDURE — 80048 BASIC METABOLIC PNL TOTAL CA: CPT | Performed by: SURGERY

## 2021-07-07 PROCEDURE — 80162 ASSAY OF DIGOXIN TOTAL: CPT | Performed by: INTERNAL MEDICINE

## 2021-07-07 PROCEDURE — 51701 INSERT BLADDER CATHETER: CPT

## 2021-07-07 PROCEDURE — 85027 COMPLETE CBC AUTOMATED: CPT | Performed by: SURGERY

## 2021-07-07 RX ORDER — DOCUSATE SODIUM 100 MG/1
100 CAPSULE, LIQUID FILLED ORAL 2 TIMES DAILY
Status: DISCONTINUED | OUTPATIENT
Start: 2021-07-07 | End: 2021-07-12

## 2021-07-07 RX ORDER — WARFARIN SODIUM 7.5 MG/1
7.5 TABLET ORAL NIGHTLY
Status: DISCONTINUED | OUTPATIENT
Start: 2021-07-07 | End: 2021-07-08 | Stop reason: SDUPTHER

## 2021-07-07 RX ORDER — BISACODYL 10 MG
10 SUPPOSITORY, RECTAL RECTAL
Status: DISCONTINUED | OUTPATIENT
Start: 2021-07-07 | End: 2021-07-12

## 2021-07-07 RX ORDER — SODIUM PHOSPHATE, DIBASIC AND SODIUM PHOSPHATE, MONOBASIC 7; 19 G/133ML; G/133ML
1 ENEMA RECTAL ONCE AS NEEDED
Status: DISCONTINUED | OUTPATIENT
Start: 2021-07-07 | End: 2021-07-12

## 2021-07-07 RX ORDER — CARVEDILOL 12.5 MG/1
12.5 TABLET ORAL 2 TIMES DAILY WITH MEALS
Status: DISCONTINUED | OUTPATIENT
Start: 2021-07-07 | End: 2021-07-12

## 2021-07-07 RX ORDER — HEPARIN SODIUM AND DEXTROSE 10000; 5 [USP'U]/100ML; G/100ML
500 INJECTION INTRAVENOUS CONTINUOUS
Status: DISCONTINUED | OUTPATIENT
Start: 2021-07-07 | End: 2021-07-09

## 2021-07-07 RX ORDER — POLYETHYLENE GLYCOL 3350 17 G/17G
17 POWDER, FOR SOLUTION ORAL DAILY PRN
Status: DISCONTINUED | OUTPATIENT
Start: 2021-07-07 | End: 2021-07-12

## 2021-07-07 NOTE — PROGRESS NOTES
Looks great  Palpable pulse in bypass graft  Doppler right peroneal signal  Dressings intact minimal drainage    Start heparin and warfarin  Podiatry evaluation   Physical therapy  Can transfer to floor if other services agree

## 2021-07-07 NOTE — PROGRESS NOTES
2000- received pt alert and orientated. Vss. Surgical sites CDI. Good cms to BLE. Pulse's per doppler. dobs and levo infusing as ordered. Will titrate accordingly. No c/o, no apparent distress noted.

## 2021-07-07 NOTE — PROGRESS NOTES
[facility] Hospitalist Progress Note                                                                   [address]  7/9/1940    SUBJECTIVE:  Pt seen and examined. Denies chest pain or SOB.   Rhett Russo • famoTIDine  20 mg Intravenous BID     Continuous Infusions:   • Heparin infusion 500 Units/hr (07/07/21 0813)   • DOBUTamine Stopped (07/07/21 0400)   • phenylephrine     • niCARdipine     • sodium chloride 100 mL/hr at 07/06/21 2000   • lactated ringe

## 2021-07-07 NOTE — PLAN OF CARE
Received pt from OR this afternoon just after 1400. Pt recovered per orders and protocol. Labs sent and ekg done. V paced on monitor. Pacer interagated this evening per order, waiting on results.   BP supported with dobutamine and levophed, started in O dressing/incision, wound bed, drain sites and surrounding tissue  - Implement wound care per orders  - Initiate isolation precautions as appropriate  - Initiate Pressure Ulcer prevention bundle as indicated  Outcome: Progressing

## 2021-07-07 NOTE — PROGRESS NOTES
[facility] Card[facility]  Progress Note    Domi Vaz Patient Status:  Inpatient    1940 MRN [id number]   [facility] 6NE-A Attending Shawn Man MD   Hosp Day # 1 PCP Sarita Atwood MD     Subjective:   No chest pain. Well-developed / Well-nourished. No acute distress. HEENT:  Normocephalic. Atraumatic. No icterus. Neck:  There is no jugular venous distention. Cardiovascular:  Cardiovascular examination demonstrates an irregular rate and rhythm.   There is normal Mildly calcified annulus. There is moderate regurgitation. 4. Left atrium: The atrium is markedly dilated. 5. Right ventricle: Estimation of the right ventricular systolic pressure is      within the normal range.    6. Right atrium: The atrium is massi

## 2021-07-07 NOTE — PHYSICAL THERAPY NOTE
PHYSICAL THERAPY EVALUATION - INPATIENT     Room Number: 1649/3456-B  Evaluation Date: 7/7/2021  Type of Evaluation: Initial  Physician Order: PT Eval and Treat    Presenting Problem: S/p R fem to peroneal artery bypass 7/6/21  Reason for Therapy:  Raquel Flores Surgeon: Ariane Elena DPM;  Location: [address] [place] [address]   • CAPSULOTOMY MT-P JT,FOOT,EACH  1/6/2012    Performed by Kole Levy at [address] [facility]      • CORRECT B 2/22/2016    Procedure: METATARSOPHALANGEAL JOINT FUSION;  Surgeon: Javier Meyer DPM;  Location: [address] [place] [address]   • RELEASE OF BIG TOE TENDN Left 5/12/2014    Procedure: TENOTOMY AND CAPSULOTOMY FOOT;  Surgeon: Javier Meyer DPM;  Location gait.    BALANCE  Static Sitting: Fair +  Dynamic Sitting: Fair  Static Standing: Fair -  Dynamic Standing: Poor +    ADDITIONAL TESTS                                    NEUROLOGICAL FINDINGS                      ACTIVITY TOLERANCE  Pulse: (!) 154  Heart R rate as high as 154 during gait. Rn [place] aware. Pt w/ pacemaker and AICD. Immediately upon sitting, heart rate returns to a normal paced rate.     Exercise/Education Provided:  Functional activity tolerated  Gait training  Transfer training    Patient E supervision     Goal #4 Up and down 4 steps w/ railing and supervision assist.   Goal #5    Goal #6    Goal Comments: Goals established on 7/7/2021

## 2021-07-07 NOTE — CONSULTS
Hilaria Baumgarten  7/9/1940  [id number]      PODIATRY, FOOT AND SURGERY CONSULTATION NOTE    REASON FOR CONSULTATION: Nonhealing right great toe ulcer    CONSULTING PHYSICIAN: Dr. Codi Kirk    HPI: Hilaria Baumgarten is a [de-identified]year old male patient well-known to my hyperlipidemia     Long term (current) use of anticoagulants [Z79.01]     Interstitial emphysema (HCC)     History of CVA in adulthood     Peripheral polyneuropathy     Non-rheumatic mitral regurgitation     VT (ventricular tachycardia) ([address]     Atheroscl SURGICAL HISTORY  '06    vascular surgery; AICD   • OTHER SURGICAL HISTORY  '90s    L foot surgery   • OTHER SURGICAL HISTORY  10/7/16    cystoscopy Dr. Denisa Mix   • PACEMAKER/DEFIBRILLATOR     • [address] TOE  1/6/2012    Performed by Mita Car collagenase   Topical Daily   • tamsulosin HCl  0.4 mg Oral Daily   • bumetanide  0.5 mg Oral QOD   • digoxin  125 mcg Oral Daily   • aspirin  81 mg Oral Daily   • famoTIDine  20 mg Oral BID    Or   • famoTIDine  20 mg Intravenous BID     Continuous Infusi Frequency of Communication with Friends and Family:       Frequency of Social Gatherings with Friends and Family:       Attends Denominational Services:       Active Member of Clubs or Organizations:       Attends Club or Organization Meetings:       [address] 29.3 26.0 - 34.0 pg    MCHC 32.5 31.0 - 37.0 g/dL    RDW 14.1 11.0 - 15.0 %    RDW-SD 46.7 (H) 35.1 - 46.3 fL   Prothrombin Time (PT)    Collection Time: 07/06/21  2:19 PM   Result Value Ref Range    PT 15.3 (H) 12.4 - 14.6 seconds    INR 1.17 (H) 0.89 - 1 - 100.0 fL    MCH 30.4 26.0 - 34.0 pg    MCHC 34.1 31.0 - 37.0 g/dL    RDW 14.3 11.0 - 15.0 %    RDW-SD 46.1 35.1 - 46.3 fL   Digoxin (Lanoxin)    Collection Time: 07/07/21  5:20 AM   Result Value Ref Range    Digoxin 0.44 (L) 0.80 - 2.00 ng/mL       HGBA1

## 2021-07-07 NOTE — PLAN OF CARE
Assumed care of patient at 0730. Patient Alert X 4. Patient v-paced on monitor but has times of v-tach on monitor. Cards aware. BB given per orders. SBP remains > 90. Left groin site c.d.I. right leg with dressings X 2, old drainage, but c/d/I.  Doppler pul

## 2021-07-08 ENCOUNTER — APPOINTMENT (OUTPATIENT)
Dept: NUCLEAR MEDICINE | Facility: HOSPITAL | Age: 81
DRG: 253 | End: 2021-07-08
Attending: PODIATRIST
Payer: MEDICARE

## 2021-07-08 LAB
ANION GAP SERPL CALC-SCNC: 4 MMOL/L (ref 0–18)
BASOPHILS # BLD AUTO: 0.02 X10(3) UL (ref 0–0.2)
BASOPHILS NFR BLD AUTO: 0.3 %
BUN BLD-MCNC: 15 MG/DL (ref 7–18)
BUN/CREAT SERPL: 18.1 (ref 10–20)
CALCIUM BLD-MCNC: 8.2 MG/DL (ref 8.5–10.1)
CHLORIDE SERPL-SCNC: 109 MMOL/L (ref 98–112)
CO2 SERPL-SCNC: 28 MMOL/L (ref 21–32)
CREAT BLD-MCNC: 0.83 MG/DL
DEPRECATED RDW RBC AUTO: 48.2 FL (ref 35.1–46.3)
EOSINOPHIL # BLD AUTO: 0.16 X10(3) UL (ref 0–0.7)
EOSINOPHIL NFR BLD AUTO: 2.1 %
ERYTHROCYTE [DISTWIDTH] IN BLOOD BY AUTOMATED COUNT: 14.2 % (ref 11–15)
GLUCOSE BLD-MCNC: 98 MG/DL (ref 70–99)
HCT VFR BLD AUTO: 36.5 %
HGB BLD-MCNC: 12.1 G/DL
IMM GRANULOCYTES # BLD AUTO: 0.03 X10(3) UL (ref 0–1)
IMM GRANULOCYTES NFR BLD: 0.4 %
INR BLD: 1.15 (ref 0.89–1.11)
LYMPHOCYTES # BLD AUTO: 1.66 X10(3) UL (ref 1–4)
LYMPHOCYTES NFR BLD AUTO: 21.9 %
MCH RBC QN AUTO: 30.6 PG (ref 26–34)
MCHC RBC AUTO-ENTMCNC: 33.2 G/DL (ref 31–37)
MCV RBC AUTO: 92.2 FL
MONOCYTES # BLD AUTO: 0.73 X10(3) UL (ref 0.1–1)
MONOCYTES NFR BLD AUTO: 9.6 %
NEUTROPHILS # BLD AUTO: 4.99 X10 (3) UL (ref 1.5–7.7)
NEUTROPHILS # BLD AUTO: 4.99 X10(3) UL (ref 1.5–7.7)
NEUTROPHILS NFR BLD AUTO: 65.7 %
OSMOLALITY SERPL CALC.SUM OF ELEC: 293 MOSM/KG (ref 275–295)
PLATELET # BLD AUTO: 187 10(3)UL (ref 150–450)
POTASSIUM SERPL-SCNC: 4.3 MMOL/L (ref 3.5–5.1)
PSA SERPL DL<=0.01 NG/ML-MCNC: 15.1 SECONDS (ref 12.4–14.6)
RBC # BLD AUTO: 3.96 X10(6)UL
SODIUM SERPL-SCNC: 141 MMOL/L (ref 136–145)
WBC # BLD AUTO: 7.6 X10(3) UL (ref 4–11)

## 2021-07-08 PROCEDURE — 80048 BASIC METABOLIC PNL TOTAL CA: CPT | Performed by: INTERNAL MEDICINE

## 2021-07-08 PROCEDURE — 85610 PROTHROMBIN TIME: CPT | Performed by: INTERNAL MEDICINE

## 2021-07-08 PROCEDURE — 78315 BONE IMAGING 3 PHASE: CPT | Performed by: PODIATRIST

## 2021-07-08 PROCEDURE — 85025 COMPLETE CBC W/AUTO DIFF WBC: CPT | Performed by: INTERNAL MEDICINE

## 2021-07-08 PROCEDURE — 51701 INSERT BLADDER CATHETER: CPT

## 2021-07-08 RX ORDER — PSEUDOEPHEDRINE HCL 30 MG
100 TABLET ORAL 2 TIMES DAILY
Qty: 30 CAPSULE | Refills: 0 | Status: SHIPPED | COMMUNITY
Start: 2021-07-08 | End: 2021-07-12

## 2021-07-08 RX ORDER — POLYETHYLENE GLYCOL 3350 17 G/17G
17 POWDER, FOR SOLUTION ORAL DAILY PRN
Qty: 30 EACH | Refills: 0 | Status: SHIPPED | COMMUNITY
Start: 2021-07-08 | End: 2021-07-12

## 2021-07-08 RX ORDER — WARFARIN SODIUM 5 MG/1
5 TABLET ORAL
Status: COMPLETED | OUTPATIENT
Start: 2021-07-08 | End: 2021-07-08

## 2021-07-08 NOTE — PROGRESS NOTES
X-RAY CONCLUSION:  Severe bony destructive and erosive changes involving the distal 1st metatarsal and articular surface of the distal phalanx.  Correlate clinically for prior history of surgery and chronic osteomyelitis versus active osteomyelitis at the

## 2021-07-08 NOTE — PHYSICAL THERAPY NOTE
Physical Therapy    Attempted to see pt this morning but patient was being transported to [facility]. Will attempt if schedule permits. Addendum:  Second attempt to see patient for PT at 1 pm but pt just ambulated with RN, will try next day.

## 2021-07-08 NOTE — PROGRESS NOTES
[facility]  Progress Note    Jason Carbajal Patient Status:  Inpatient    1940 MRN [id number]   [facility] 6NE-A Attending Vani Cristobal MD   Hosp Day # 2 PCP Dell Miller MD     Subjective:   Comfortable in kg)      Allergies:  No Known Allergies    Physical Exam:   General:  Well-developed / Well-nourished. No acute distress. HEENT:  Normocephalic. Atraumatic. No icterus. Neck:  There is no jugular venous distention.    Cardiovascular:  Cardiovascular ex    fraction is 15-20%. Wall motion is normal; there are no regional wall      motion abnormalities. 2. Aortic valve: Trileaflet; mildly thickened, mildly calcified leaflets.      There is mild regurgitation. 3. Mitral valve: Mildly calcified annulus.

## 2021-07-08 NOTE — PROGRESS NOTES
[facility] Hospitalist Progress Note                                                                   [address]  7/9/1940    SUBJECTIVE:  Pt seen and examined. Denies chest pain or SOB.   Anand Durbin mg Oral Daily   • famoTIDine  20 mg Oral BID    Or   • famoTIDine  20 mg Intravenous BID     Continuous Infusions:   • Heparin infusion 500 Units/hr (07/07/21 0813)   • DOBUTamine Stopped (07/07/21 0400)   • phenylephrine     • niCARdipine     • sodium chl

## 2021-07-08 NOTE — PLAN OF CARE
Pt has been hemodynamically stable throughout the day. BP medications spaced out to prevent hypotension. SBP low of 88 in which pt was resting in bed and asymptomatic. Pt remains in paced rhythm with underlying afib.   Pt remains on heparin gtt; not foll

## 2021-07-08 NOTE — CM/SW NOTE
07/08/21 1200   CM/SW Screening   Referral Source    Information Source Chart review   Patient's Mental Status Alert;Oriented   Patient lives with Spouse   Discharge Needs   Anticipated D/C needs To be determined     Patient was screened, no

## 2021-07-08 NOTE — PLAN OF CARE
Assumed care of pt at [address] [place] [address]. A&O x4. Spo2>94 on RA. Ventricular paced. L groin intact, soft, no hematoma. Dressings on R thigh and calf, no drainage. Dressing on right foot has small amount of old drainage. Doppler pulses.  Pt attempted to void this evening

## 2021-07-09 ENCOUNTER — ANESTHESIA EVENT (OUTPATIENT)
Dept: SURGERY | Facility: HOSPITAL | Age: 81
DRG: 253 | End: 2021-07-09
Payer: MEDICARE

## 2021-07-09 LAB
ANION GAP SERPL CALC-SCNC: 6 MMOL/L (ref 0–18)
BASOPHILS # BLD AUTO: 0.03 X10(3) UL (ref 0–0.2)
BASOPHILS NFR BLD AUTO: 0.5 %
BUN BLD-MCNC: 15 MG/DL (ref 7–18)
BUN/CREAT SERPL: 20.3 (ref 10–20)
CALCIUM BLD-MCNC: 8 MG/DL (ref 8.5–10.1)
CHLORIDE SERPL-SCNC: 109 MMOL/L (ref 98–112)
CO2 SERPL-SCNC: 24 MMOL/L (ref 21–32)
CREAT BLD-MCNC: 0.74 MG/DL
DEPRECATED RDW RBC AUTO: 46.2 FL (ref 35.1–46.3)
EOSINOPHIL # BLD AUTO: 0.24 X10(3) UL (ref 0–0.7)
EOSINOPHIL NFR BLD AUTO: 3.6 %
ERYTHROCYTE [DISTWIDTH] IN BLOOD BY AUTOMATED COUNT: 14.2 % (ref 11–15)
GLUCOSE BLD-MCNC: 94 MG/DL (ref 70–99)
HAV IGM SER QL: 1.9 MG/DL (ref 1.6–2.6)
HCT VFR BLD AUTO: 33.1 %
HGB BLD-MCNC: 11.3 G/DL
IMM GRANULOCYTES # BLD AUTO: 0.02 X10(3) UL (ref 0–1)
IMM GRANULOCYTES NFR BLD: 0.3 %
INR BLD: 1.24 (ref 0.89–1.11)
LYMPHOCYTES # BLD AUTO: 1.34 X10(3) UL (ref 1–4)
LYMPHOCYTES NFR BLD AUTO: 20.4 %
MCH RBC QN AUTO: 30.3 PG (ref 26–34)
MCHC RBC AUTO-ENTMCNC: 34.1 G/DL (ref 31–37)
MCV RBC AUTO: 88.7 FL
MONOCYTES # BLD AUTO: 0.64 X10(3) UL (ref 0.1–1)
MONOCYTES NFR BLD AUTO: 9.7 %
NEUTROPHILS # BLD AUTO: 4.31 X10 (3) UL (ref 1.5–7.7)
NEUTROPHILS # BLD AUTO: 4.31 X10(3) UL (ref 1.5–7.7)
NEUTROPHILS NFR BLD AUTO: 65.5 %
OSMOLALITY SERPL CALC.SUM OF ELEC: 289 MOSM/KG (ref 275–295)
PLATELET # BLD AUTO: 177 10(3)UL (ref 150–450)
POTASSIUM SERPL-SCNC: 3.5 MMOL/L (ref 3.5–5.1)
POTASSIUM SERPL-SCNC: 4.1 MMOL/L (ref 3.5–5.1)
PSA SERPL DL<=0.01 NG/ML-MCNC: 16 SECONDS (ref 12.4–14.6)
RBC # BLD AUTO: 3.73 X10(6)UL
SODIUM SERPL-SCNC: 139 MMOL/L (ref 136–145)
WBC # BLD AUTO: 6.6 X10(3) UL (ref 4–11)

## 2021-07-09 PROCEDURE — 84132 ASSAY OF SERUM POTASSIUM: CPT | Performed by: INTERNAL MEDICINE

## 2021-07-09 PROCEDURE — 97116 GAIT TRAINING THERAPY: CPT

## 2021-07-09 PROCEDURE — 97110 THERAPEUTIC EXERCISES: CPT

## 2021-07-09 PROCEDURE — 80048 BASIC METABOLIC PNL TOTAL CA: CPT | Performed by: INTERNAL MEDICINE

## 2021-07-09 PROCEDURE — 85610 PROTHROMBIN TIME: CPT | Performed by: INTERNAL MEDICINE

## 2021-07-09 PROCEDURE — 85025 COMPLETE CBC W/AUTO DIFF WBC: CPT | Performed by: INTERNAL MEDICINE

## 2021-07-09 PROCEDURE — 83735 ASSAY OF MAGNESIUM: CPT | Performed by: INTERNAL MEDICINE

## 2021-07-09 RX ORDER — ENOXAPARIN SODIUM 100 MG/ML
80 INJECTION SUBCUTANEOUS EVERY 12 HOURS SCHEDULED
Status: DISCONTINUED | OUTPATIENT
Start: 2021-07-09 | End: 2021-07-12

## 2021-07-09 RX ORDER — POTASSIUM CHLORIDE 20 MEQ/1
40 TABLET, EXTENDED RELEASE ORAL EVERY 4 HOURS
Status: COMPLETED | OUTPATIENT
Start: 2021-07-09 | End: 2021-07-09

## 2021-07-09 RX ORDER — WARFARIN SODIUM 7.5 MG/1
7.5 TABLET ORAL
Status: ACTIVE | OUTPATIENT
Start: 2021-07-09 | End: 2021-07-10

## 2021-07-09 NOTE — PROGRESS NOTES
[facility] Hospitalist Progress Note                                                                   [address]  7/9/1940    SUBJECTIVE:  Pt seen and examined. Denies chest pain or SOB.   Peace Cyr meals   • Sacubitril-Valsartan  1 tablet Oral BID   • collagenase   Topical Daily   • tamsulosin HCl  0.4 mg Oral Daily   • bumetanide  0.5 mg Oral QOD   • digoxin  125 mcg Oral Daily   • aspirin  81 mg Oral Daily   • famoTIDine  20 mg Oral BID    Or   • f

## 2021-07-09 NOTE — PROGRESS NOTES
[facility]  Progress Note    Vee Eastman Patient Status:  Inpatient    1940 MRN [id number]   [facility] 6NE-A Attending Carol Segura MD   Hosp Day # 3 PCP Keven Godfrey MD     Subjective:   Comfortable in There is no jugular venous distention. Cardiovascular:  Cardiovascular examination demonstrates an irregular rate and rhythm. There is normal S1, S2. There is no S3 or S4. There are no murmurs, rubs, or gallops. No click is appreciated.   PMI is nondi    There is mild regurgitation. 3. Mitral valve: Mildly calcified annulus. There is moderate regurgitation. 4. Left atrium: The atrium is markedly dilated.    5. Right ventricle: Estimation of the right ventricular systolic pressure is      within the

## 2021-07-09 NOTE — ANESTHESIA PREPROCEDURE EVALUATION
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  TAKE 1 TABLET DAILY ), Disp: 90 tablet, Rfl: 3, 7/6/2021 at Unknown time  atorvastatin 40 MG Oral Tab, Take 1 tablet (40 mg total) by mouth nightly fibrillation  (+) CHF                Endo/Other    Negative endo/other ROS. Pulmonary        (+) COPD and mild  COPD not requiring home oxygen.                 Neuro/Psych  Comment: Peripheral neuropathy        (+) CVA and no re 1/6/2012    Performed by Nicolasa Plummer at [address]     • JUAN ROMAN/IVA/BELÉN  1/6/2012    Performed by Nicolasa Plummer at [facility]   • FU [address]   • RELEASE OF BIG TOE TENDN Left 5/12/2014    Procedure: TENOTOMY AND CAPSULOTOMY FOOT;  Surgeon: Ariane Elena DPM;  Location: [address] [place] [address]  1/6/2012    Performed by Kole serrano  Airway      Mallampati: II  Mouth opening: >3 FB  TM distance: > 6 cm  Neck ROM: full Cardiovascular    Cardiovascular exam normal.  Rhythm: regular  Rate: normal  (+) murmur   Dental    No notable dental history.          Pulmonary    Pulmonary exa

## 2021-07-09 NOTE — PLAN OF CARE
Assumed care at [address] [facility] and oriented x4   VSS   Heparin gtt infusing per order  Norco given for pain with relief   Surgical dressings c/d/i  Pedal and post tib pulses per doppler   POC discussed with pt   Call light in reach, pt resting comfortably, w

## 2021-07-09 NOTE — PROGRESS NOTES
Subjective:   Comfortable in chair. X-rays and bone scan completed. No other complaints reported.       Objective:   07/09/21  0900   BP: 113/54   Pulse: 97   Resp: 25   Temp: 98 °F (36.7 °C)     General :  AA&O x3, NAD. VSS, Afebrile.    Integument: Open SFA to peroneal artery bypass with left great saphenous vein - redo bypass  7/6/21     Plan:  X-rays and bone scan reviewed  Osteomyelitis right 1st metatarsal with hardware present.   I am recommending removal of infected bone and hardware right foot  I di

## 2021-07-09 NOTE — PLAN OF CARE
A/OX4, RA, VSS, AV paced per Tele  Deneis any pain at this time  Plan for surgery tomorrow, NPO after midnight, anticoagulation on hold per surgery   Pt ambulating halls, up to chair for meal   Will cont to monitor.

## 2021-07-10 ENCOUNTER — ANESTHESIA (OUTPATIENT)
Dept: SURGERY | Facility: HOSPITAL | Age: 81
DRG: 253 | End: 2021-07-10
Payer: MEDICARE

## 2021-07-10 LAB
GLUCOSE BLD-MCNC: 101 MG/DL (ref 70–99)
INR BLD: 1.26 (ref 0.89–1.11)
PSA SERPL DL<=0.01 NG/ML-MCNC: 16.2 SECONDS (ref 12.4–14.6)

## 2021-07-10 PROCEDURE — 82962 GLUCOSE BLOOD TEST: CPT

## 2021-07-10 PROCEDURE — 0QBN0ZZ EXCISION OF RIGHT METATARSAL, OPEN APPROACH: ICD-10-PCS | Performed by: PODIATRIST

## 2021-07-10 PROCEDURE — 0JBQ0ZZ EXCISION OF RIGHT FOOT SUBCUTANEOUS TISSUE AND FASCIA, OPEN APPROACH: ICD-10-PCS | Performed by: PODIATRIST

## 2021-07-10 PROCEDURE — 88311 DECALCIFY TISSUE: CPT | Performed by: PODIATRIST

## 2021-07-10 PROCEDURE — 0QPN04Z REMOVAL OF INTERNAL FIXATION DEVICE FROM RIGHT METATARSAL, OPEN APPROACH: ICD-10-PCS | Performed by: PODIATRIST

## 2021-07-10 PROCEDURE — 88304 TISSUE EXAM BY PATHOLOGIST: CPT | Performed by: PODIATRIST

## 2021-07-10 PROCEDURE — 85610 PROTHROMBIN TIME: CPT | Performed by: INTERNAL MEDICINE

## 2021-07-10 RX ORDER — SODIUM CHLORIDE, SODIUM LACTATE, POTASSIUM CHLORIDE, CALCIUM CHLORIDE 600; 310; 30; 20 MG/100ML; MG/100ML; MG/100ML; MG/100ML
INJECTION, SOLUTION INTRAVENOUS CONTINUOUS
Status: DISCONTINUED | OUTPATIENT
Start: 2021-07-10 | End: 2021-07-10

## 2021-07-10 RX ORDER — SODIUM CHLORIDE 9 MG/ML
INJECTION, SOLUTION INTRAVENOUS CONTINUOUS
Status: DISCONTINUED | OUTPATIENT
Start: 2021-07-10 | End: 2021-07-10 | Stop reason: HOSPADM

## 2021-07-10 RX ORDER — MIDAZOLAM HYDROCHLORIDE 1 MG/ML
INJECTION INTRAMUSCULAR; INTRAVENOUS AS NEEDED
Status: DISCONTINUED | OUTPATIENT
Start: 2021-07-10 | End: 2021-07-10 | Stop reason: SURG

## 2021-07-10 RX ORDER — HYDROMORPHONE HYDROCHLORIDE 1 MG/ML
0.4 INJECTION, SOLUTION INTRAMUSCULAR; INTRAVENOUS; SUBCUTANEOUS EVERY 5 MIN PRN
Status: DISCONTINUED | OUTPATIENT
Start: 2021-07-10 | End: 2021-07-10 | Stop reason: HOSPADM

## 2021-07-10 RX ORDER — BUPIVACAINE HYDROCHLORIDE 5 MG/ML
INJECTION, SOLUTION EPIDURAL; INTRACAUDAL AS NEEDED
Status: DISCONTINUED | OUTPATIENT
Start: 2021-07-10 | End: 2021-07-10 | Stop reason: HOSPADM

## 2021-07-10 RX ORDER — SODIUM CHLORIDE 9 MG/ML
INJECTION, SOLUTION INTRAVENOUS CONTINUOUS PRN
Status: DISCONTINUED | OUTPATIENT
Start: 2021-07-10 | End: 2021-07-10 | Stop reason: SURG

## 2021-07-10 RX ORDER — CEFAZOLIN SODIUM 1 G/3ML
INJECTION, POWDER, FOR SOLUTION INTRAMUSCULAR; INTRAVENOUS AS NEEDED
Status: DISCONTINUED | OUTPATIENT
Start: 2021-07-10 | End: 2021-07-10 | Stop reason: SURG

## 2021-07-10 RX ORDER — NALOXONE HYDROCHLORIDE 0.4 MG/ML
80 INJECTION, SOLUTION INTRAMUSCULAR; INTRAVENOUS; SUBCUTANEOUS AS NEEDED
Status: DISCONTINUED | OUTPATIENT
Start: 2021-07-10 | End: 2021-07-10 | Stop reason: HOSPADM

## 2021-07-10 RX ADMIN — SODIUM CHLORIDE: 9 INJECTION, SOLUTION INTRAVENOUS at 14:57:00

## 2021-07-10 RX ADMIN — CEFAZOLIN SODIUM 2 G: 1 INJECTION, POWDER, FOR SOLUTION INTRAMUSCULAR; INTRAVENOUS at 14:28:00

## 2021-07-10 RX ADMIN — SODIUM CHLORIDE: 9 INJECTION, SOLUTION INTRAVENOUS at 14:09:00

## 2021-07-10 RX ADMIN — MIDAZOLAM HYDROCHLORIDE 1 MG: 1 INJECTION INTRAMUSCULAR; INTRAVENOUS at 14:20:00

## 2021-07-10 NOTE — PROGRESS NOTES
[facility]  Progress Note    Subjective:   Comfortable in bed. No chest pain. No shortness of breath. No new focal cardiovascular complaints reported.     Objective:   07/09/21  2130 07/10/21  0015 07/10/21  0430 07/10/21  0740   BP: irregular rate and rhythm. There is normal S1, S2. There is no S3 or S4. There are no murmurs, rubs, or gallops. No click is appreciated. PMI is nondisplaced with a normal apical impulse. Pulmonary:  Lungs are clear to auscultation bilaterally.   Lenda Bradford regional wall      motion abnormalities. 2. Aortic valve: Trileaflet; mildly thickened, mildly calcified leaflets.      There is mild regurgitation. 3. Mitral valve: Mildly calcified annulus. There is moderate regurgitation.    4. Left atrium: The atriu

## 2021-07-10 NOTE — PLAN OF CARE
Assumed care this am  Pt NPO since midnight for partial toe amp with Lynn. Pt does not report pain. Walking in halls with staff. Needs met, will continue to monitor     To OR for partial toe amp and debridement. Pt returned. No reports of pain.

## 2021-07-10 NOTE — ANESTHESIA POSTPROCEDURE EVALUATION
[address] [facility] Patient Status:  Inpatient   Age/Gender 80year old male MRN [id number]   [facility] SURGERY Attending Keshia Valerio, [address] [facility] Se Day # 4 PCP Corky Browning MD       Anesthesia Post-op Note    REMOVAL OF HA

## 2021-07-10 NOTE — PROGRESS NOTES
[facility] Hospitalist Progress Note                                                                   [address]  7/9/1940    SUBJECTIVE:  Pt seen and examined. Denies chest pain or SOB.   [facility] enoxaparin  80 mg Subcutaneous 2 times per day   • warfarin  7.5 mg Oral Once at night   • docusate sodium  100 mg Oral BID   • carvedilol  12.5 mg Oral BID with meals   • Sacubitril-Valsartan  1 tablet Oral BID   • collagenase   Topical Daily   • tamsulos Urinary retention   - monitor PVR, straight cath if PVR > 400 ml  - if retention still an issue post op, pt may require wheatley and  eval     DVT prophy - lov subq    Likely discharge home tomorrow. POC d/w pt who agrees.      [facility]

## 2021-07-10 NOTE — OPERATIVE REPORT
[facility]    PATIENT'S NAME: Marija Quiroz   ATTENDING PHYSICIAN: Abigail Montoya M.D. OPERATING PHYSICIAN: Jessenia Payton D.P.M.    PATIENT ACCOUNT#:   [de-identified]    LOCATION:  [address] [place] [address]  MEDICAL RECORD #:   [id number]       DATE OF B Subperiosteal dissection was performed.   It should be noted that the first metatarsophalangeal joint was fused and the incision was extended distally to the level of the hallux interphalangeal joint were a McGlamry elevator was utilized to free up all soft

## 2021-07-10 NOTE — PLAN OF CARE
Assumed care 1900  Patient alert, oriented x4  Toe dressing changed  Showered with assist  Patient reports no void since surgery-bladder scan and straight cath 450 ml out  NPO at midnight  norco for generalized pain x1

## 2021-07-10 NOTE — BRIEF OP NOTE
Pre-Operative Diagnosis: osteomyelitis right foot, retained hardware, ulcer right foot     Post-Operative Diagnosis: osteomyelitis right foot, retained hardware, ulcer right foot      Procedure Performed:   REMOVAL OF HARDWARE RIGHT FOOT,  REMOVAL OF 1ST M

## 2021-07-11 LAB
ANION GAP SERPL CALC-SCNC: 4 MMOL/L (ref 0–18)
BASOPHILS # BLD AUTO: 0.02 X10(3) UL (ref 0–0.2)
BASOPHILS NFR BLD AUTO: 0.3 %
BLOOD TYPE BARCODE: 6200
BUN BLD-MCNC: 16 MG/DL (ref 7–18)
BUN/CREAT SERPL: 18.8 (ref 10–20)
CALCIUM BLD-MCNC: 8.3 MG/DL (ref 8.5–10.1)
CHLORIDE SERPL-SCNC: 109 MMOL/L (ref 98–112)
CO2 SERPL-SCNC: 27 MMOL/L (ref 21–32)
CREAT BLD-MCNC: 0.85 MG/DL
DEPRECATED RDW RBC AUTO: 47.5 FL (ref 35.1–46.3)
EOSINOPHIL # BLD AUTO: 0.21 X10(3) UL (ref 0–0.7)
EOSINOPHIL NFR BLD AUTO: 2.9 %
ERYTHROCYTE [DISTWIDTH] IN BLOOD BY AUTOMATED COUNT: 14.4 % (ref 11–15)
GLUCOSE BLD-MCNC: 91 MG/DL (ref 70–99)
HCT VFR BLD AUTO: 33.7 %
HGB BLD-MCNC: 11.1 G/DL
IMM GRANULOCYTES # BLD AUTO: 0.03 X10(3) UL (ref 0–1)
IMM GRANULOCYTES NFR BLD: 0.4 %
LYMPHOCYTES # BLD AUTO: 1.34 X10(3) UL (ref 1–4)
LYMPHOCYTES NFR BLD AUTO: 18.7 %
MCH RBC QN AUTO: 29.8 PG (ref 26–34)
MCHC RBC AUTO-ENTMCNC: 32.9 G/DL (ref 31–37)
MCV RBC AUTO: 90.6 FL
MONOCYTES # BLD AUTO: 0.69 X10(3) UL (ref 0.1–1)
MONOCYTES NFR BLD AUTO: 9.6 %
NEUTROPHILS # BLD AUTO: 4.87 X10 (3) UL (ref 1.5–7.7)
NEUTROPHILS # BLD AUTO: 4.87 X10(3) UL (ref 1.5–7.7)
NEUTROPHILS NFR BLD AUTO: 68.1 %
OSMOLALITY SERPL CALC.SUM OF ELEC: 291 MOSM/KG (ref 275–295)
PLATELET # BLD AUTO: 203 10(3)UL (ref 150–450)
POTASSIUM SERPL-SCNC: 4.1 MMOL/L (ref 3.5–5.1)
RBC # BLD AUTO: 3.72 X10(6)UL
SODIUM SERPL-SCNC: 140 MMOL/L (ref 136–145)
WBC # BLD AUTO: 7.2 X10(3) UL (ref 4–11)

## 2021-07-11 PROCEDURE — 85025 COMPLETE CBC W/AUTO DIFF WBC: CPT | Performed by: PODIATRIST

## 2021-07-11 PROCEDURE — 80048 BASIC METABOLIC PNL TOTAL CA: CPT | Performed by: PODIATRIST

## 2021-07-11 PROCEDURE — S0028 INJECTION, FAMOTIDINE, 20 MG: HCPCS | Performed by: PODIATRIST

## 2021-07-11 NOTE — PROGRESS NOTES
Order reviewed with Dr Noyola Form. Pt is to remain bedrest until tomorrow.  Bandage can be reinforced if bleeding

## 2021-07-11 NOTE — PROGRESS NOTES
[facility] Hospitalist Progress Note                                                                   [address]  7/9/1940    SUBJECTIVE:  Pt seen and examined.   Underwent R foot surgery yes --  91    < > = values in this interval not displayed. No results for input(s): ALT, AST, ALB, AMYLASE, LIPASE, LDH in the last 168 hours.     Invalid input(s): ALPHOS, TBIL, DBIL, TPROT    Recent Labs   Lab 07/10/21  1139   PGLU 101*       Meds:   Sc night's dose for surgery today)  - cont ASA per vascular      # Non healing R great toe ulcer with underlying OM of R 1st metatarsal   - appreciate podiatry c/s   - bone scan showing OM  - s/p removal of infected bone and hardware in R foot 7/10  - podiatr

## 2021-07-11 NOTE — CONSULTS
[facility]  Report of Consultation    Hilaria Baumgarten Patient Status:  Inpatient    1940 MRN [id number]   [facility] 7NE-A Attending Bambi Vega MD   Hosp Day # 5 PCP Angie Snyder MD     Reason for Consultation:  Post op r infarction) 6/11    no residual defects   • Essential hypertension, hypertension with unspecified goal 5/16/2016   • High blood pressure    • High cholesterol    • Hypercholesterolemia    • Kidney disease    • Other and unspecified hyperlipidemia    • OTHE [address]   • PATIENT DOCUMENTED NOT TO HAVE EXPERIENCED ANY OF THE FOLLOWING EVENTS Left 5/12/2014    Procedure: TENOTOMY AND CAPSULOTOMY FOOT;  Surgeon: Javier Meyer DPM;  Location: [address] [place] [address]   • PATIENT DOCUMENTED NOT TO HA Facility-Administered Medications:   •  Enoxaparin Sodium (LOVENOX) 80 MG/0.8ML injection 80 mg, 80 mg, Subcutaneous, 2 times per day  •  docusate sodium (COLACE) cap 100 mg, 100 mg, Oral, BID  •  PEG 3350 (MIRALAX) powder packet 17 g, 17 g, Oral, Daily MI swelling  URINE is yellow via Santana    Laboratory Data:  Lab Results   Component Value Date    WBC 7.2 07/11/2021    HGB 11.1 07/11/2021    HCT 33.7 07/11/2021    .0 07/11/2021    CREATSERUM 0.85 07/11/2021    BUN 16 07/11/2021     07/11/2021 retention  Otherwise is interested to repeat cystoscopy in the office with me to see if consideration for a prostate procedure is an option for his recurring retention  Recommend alpha blocker (currently on tamsulosin) - has been on Alfuzosin on the outpat

## 2021-07-11 NOTE — PLAN OF CARE
Assumed care of patient at [address] [place] [address]. Patient is alert and oriented. Norco administered once for pain control. Dressing to right foot intact with dried drainage. Patient remains on bedrest. Straight cathed once with clear yellow urine output.  Plan to return alex S/S of infection  Description: INTERVENTIONS:  - Assess and document risk factors for pressure ulcer development  - Assess and document skin integrity  - Assess and document dressing/incision, wound bed, drain sites and surrounding tissue  - Implement woun

## 2021-07-11 NOTE — PLAN OF CARE
Assumed care this am  Pt seems to be more confused. Hallucinated man in his bed while sitting in the chair this afternoon. Santana removed per order. Voiding trial started. Pain meds decreased d/t the confusion and hallucinations.    Dressings changed dur

## 2021-07-11 NOTE — PROGRESS NOTES
[facility] Cardiology  Progress Note    Subjective:   Comfortable in bed. No chest pain. No shortness of breath. No new focal cardiovascular complaints reported.     Objective:   07/10/21  1900 07/10/21  2000 07/11/21  0130 07/11/21  0430   BP: There are no murmurs, rubs, or gallops. No click is appreciated. PMI is nondisplaced with a normal apical impulse. Pulmonary:  Lungs are clear to auscultation bilaterally. There are no focal rales, rhonchi, or wheezes.   Good air movement is noted thr mildly thickened, mildly calcified leaflets.      There is mild regurgitation. 3. Mitral valve: Mildly calcified annulus. There is moderate regurgitation. 4. Left atrium: The atrium is markedly dilated.    5. Right ventricle: Estimation of the right elio

## 2021-07-12 VITALS
HEART RATE: 73 BPM | OXYGEN SATURATION: 100 % | TEMPERATURE: 97 F | DIASTOLIC BLOOD PRESSURE: 61 MMHG | SYSTOLIC BLOOD PRESSURE: 104 MMHG | HEIGHT: 73 IN | BODY MASS INDEX: 23.19 KG/M2 | RESPIRATION RATE: 24 BRPM | WEIGHT: 175 LBS

## 2021-07-12 LAB
INR BLD: 1.2 (ref 0.89–1.11)
PSA SERPL DL<=0.01 NG/ML-MCNC: 15.6 SECONDS (ref 12.4–14.6)

## 2021-07-12 PROCEDURE — 85610 PROTHROMBIN TIME: CPT | Performed by: INTERNAL MEDICINE

## 2021-07-12 PROCEDURE — 97530 THERAPEUTIC ACTIVITIES: CPT

## 2021-07-12 RX ORDER — POLYETHYLENE GLYCOL 3350 17 G/17G
17 POWDER, FOR SOLUTION ORAL DAILY PRN
Qty: 30 EACH | Refills: 0 | Status: SHIPPED | OUTPATIENT
Start: 2021-07-12 | End: 2021-08-03 | Stop reason: ALTCHOICE

## 2021-07-12 RX ORDER — PSEUDOEPHEDRINE HCL 30 MG
100 TABLET ORAL 2 TIMES DAILY
Qty: 30 CAPSULE | Refills: 0 | Status: SHIPPED | OUTPATIENT
Start: 2021-07-12 | End: 2021-08-03 | Stop reason: ALTCHOICE

## 2021-07-12 RX ORDER — ENOXAPARIN SODIUM 100 MG/ML
INJECTION SUBCUTANEOUS
Qty: 10 EACH | Refills: 1 | Status: SHIPPED | OUTPATIENT
Start: 2021-07-12 | End: 2021-08-03 | Stop reason: ALTCHOICE

## 2021-07-12 RX ORDER — ASPIRIN 81 MG/1
81 TABLET ORAL DAILY
Qty: 100 TABLET | Refills: 0 | Status: SHIPPED | OUTPATIENT
Start: 2021-07-13 | End: 2021-10-21

## 2021-07-12 RX ORDER — CARVEDILOL 12.5 MG/1
12.5 TABLET ORAL 2 TIMES DAILY WITH MEALS
Qty: 120 TABLET | Refills: 0 | Status: SHIPPED | OUTPATIENT
Start: 2021-07-12 | End: 2021-09-10

## 2021-07-12 RX ORDER — CARVEDILOL 12.5 MG/1
12.5 TABLET ORAL 2 TIMES DAILY WITH MEALS
Qty: 120 TABLET | Refills: 0 | Status: SHIPPED | OUTPATIENT
Start: 2021-07-12 | End: 2021-07-12

## 2021-07-12 RX ORDER — ENOXAPARIN SODIUM 100 MG/ML
INJECTION SUBCUTANEOUS
Qty: 10 EACH | Refills: 1 | Status: SHIPPED | OUTPATIENT
Start: 2021-07-12 | End: 2021-07-12

## 2021-07-12 RX ORDER — ASPIRIN 81 MG/1
81 TABLET ORAL DAILY
Qty: 100 TABLET | Refills: 0 | Status: SHIPPED | OUTPATIENT
Start: 2021-07-13 | End: 2021-07-12

## 2021-07-12 NOTE — PROGRESS NOTES
[facility]  Urology Progress Note    Jasiel Grimm Patient Status:  Inpatient    1940 MRN [id number]   [facility] 7NE-A Attending Ana Brannon MD   [facility] Day # 6 PCP Tasha Brown MD     Subjective:  Jasiel Grimm is a(n

## 2021-07-12 NOTE — PROGRESS NOTES
[facility] Hospitalist Progress Note                                                                   [address]  7/9/1940    SUBJECTIVE:  Pt seen and examined.   Patient with wheatley removed a AMYLASE, LIPASE, LDH in the last 168 hours.     Invalid input(s): ALPHOS, TBIL, DBIL, TPROT    Recent Labs   Lab 07/10/21  1139   PGLU 101*       Meds:   Scheduled:   • enoxaparin  80 mg Subcutaneous 2 times per day   • docusate sodium  100 mg Oral BID   • per vascular      # Non healing R great toe ulcer with underlying OM of R 1st metatarsal   - appreciate podiatry c/s   - bone scan showing OM  - s/p removal of infected bone and hardware in R foot 7/10  - podiatry--> ok with discharge    # Urinary retentio

## 2021-07-12 NOTE — PHYSICAL THERAPY NOTE
PHYSICAL THERAPY TREATMENT NOTE - INPATIENT    Room Number: 2520/3119-P     Session: 2   Number of Visits to Meet Established Goals: 3    Presenting Problem: S/p R fem to peroneal artery bypass 7/6/21    Problem List  Active Problems:    Atherosclerosis o Procedure: METATARSOPHALANGEAL JOINT FUSION;  Surgeon: Stephania Rapp DPM;  Location: [address] [place] [address]   • INCISION FLEX TOE TENDON Left 5/12/2014    Procedure: ABDUCTOR HALLUCIS BREVIS TENDON TRANSFER/LENGTHENING;  Surgeon: Stephania Rapp DPM; Erica Linn  1/6/2012    Performed by Haroldo Quintero at [address] [facility] 5/12/2014    Procedure: METATARSAL HEAD RESECTION;  Surgeon: Savi Early DPM;  Location: [address] [place] [address] CMS Modifier (G-Code): CJ    FUNCTIONAL ABILITY STATUS  Gait Assessment   Gait Assistance: Not tested  Distance (ft): 0  Assistive Device: Rolling walker  Pattern:  (N/A)  Stoop/Curb Assistance: Not tested  Comment : N/A    Skilled Therapy Provided: Rn c Patient is able to demonstrate transfers EOB to/from BSC at assistance level: modified independent      Goal #3 Patient is able to ambulate 300 feet with assist device: walker - rolling at assistance level: supervision      Goal #4 Up and down 4 steps w/ r

## 2021-07-12 NOTE — PROGRESS NOTES
Okay to discharge from my standpoint once cleared by all other services  Daily dressing changes as d/w nurse and patient  WBAT with surgical shoe  Okay to resume coumadin  F/u in 10-14 days

## 2021-07-12 NOTE — PLAN OF CARE
Assumed care at [address]  R foot dressing c/d/i  Pain managed with norco   Santana removed this morning  Needs met will continue to monitor        Problem: Patient/Family Goals  Goal: Patient/Family Long Term Goal  Description: Patient's Long Term Goal: return h

## 2021-07-12 NOTE — PLAN OF CARE
NURSING DISCHARGE NOTE    Assumed pt care at 0730  VSS, A&Ox4  Pt denies pain  Right foot dressing changed with spouse, demonstrated and provided supplies  PVR 50 ml, pt urinating adequate amounts  Pulses per doppler  Incisions c/d/I, groin incision re

## 2021-07-12 NOTE — PROGRESS NOTES
[facility] Cardiology  Progress Note    Roger Moya Patient Status:  Inpatient    1940 MRN [id number]   [facility] 6NE-A Attending Alonzo Siddiqui MD   Hosp Day # 6 PCP Laura Grant MD     Subjective:   Comfortable in There is no jugular venous distention. Cardiovascular:  Cardiovascular examination demonstrates an irregular rate and rhythm. There is normal S1, S2. There is no S3 or S4. There are no murmurs, rubs, or gallops. No click is appreciated.   PMI is nond ejection      fraction is 15-20%. Wall motion is normal; there are no regional wall      motion abnormalities. 2. Aortic valve: Trileaflet; mildly thickened, mildly calcified leaflets.      There is mild regurgitation.    3. Mitral valve: Mildly calcified Matias Shoemaker MD  7/12/2021

## 2021-07-15 NOTE — H&P
[facility]  Vascular Surgery Consultation    Truong Eric Patient Status:  Inpatient    1940 MRN [id number]   [facility] 7NE-A Attending No att. providers found   [facility] Day # 6 PCP Lennox Billing, MD         History of Present I 2/22/2016    Procedure: METATARSOPHALANGEAL JOINT FUSION;  Surgeon: Deep Luna DPM;  Location: [address] [place] [address]   • INCISION FLEX TOE TENDON Left 5/12/2014    Procedure: ABDUCTOR HALLUCIS BREVIS TENDON TRANSFER/LENGTHENING;  Surgeon: Leeroy Newman, REPAIR OF Nicol Person  1/6/2012    Performed by Kole Levy at [address] [facility] 5/12/2014    Procedure: METATARSAL HEAD RESECTION;  Surgeon: Ariane Elena DPM;  Location: [place] [address] no murmur, no gallop   ABDOMEN: soft, non-tender with no palpable aneurysm or masses  BACK: normal, no tenderness  SKIN: no rashes, no suspicious lesions, warm and dry  EXTREMITIES: no edema, full range of motion, no tenderness  NEURO/PSYCH: orientated x3,

## 2021-07-15 NOTE — DISCHARGE SUMMARY
Vascular Surgery  Surgical Discharge Summary    Admission Date: 7/6/2021   D/C Date: 7/12/2021  Discharge Diagnosis: status post fem peroneal bypass  Discharge Condition: good      HISTORY OF PRESENT ILLNESS: Jessica Eastman is a 80year old  mal Location: [facility] [address] <10SQCM Left 5/12/2014    Procedure: TENOTOMY AND CAPSULOTOMY FOOT;  Surgeon: Sara Prescott DPM;  Location: [address] [place] [address]   • CAPSULOTOMY MT-P JT,FOOT,EACH  1/6/2012    Perfor Aicha Agosto DPM;  Location: [address] [place] [address]   • PATIENT WITH PREOPERATIVE ORDER FOR IV ANTIBIOTIC SURGICAL SITE INFECT Right 2/22/2016    Procedure: METATARSOPHALANGEAL JOINT FUSION;  Surgeon: Aicha Agosto DPM;  Location: [facility] SURGICAL MIRELLA daily. 90 tablet 1   • digoxin 0.125 MG Oral Tab TAKE 1 TABLET DAILY (Patient taking differently: Take 125 mcg by mouth daily. TAKE 1 TABLET DAILY ) 90 tablet 3   • atorvastatin 40 MG Oral Tab Take 1 tablet (40 mg total) by mouth nightly.  90 tablet 3   • W

## 2021-07-16 NOTE — OPERATIVE REPORT
[facility]    PATIENT'S NAME: La Adrian   ATTENDING PHYSICIAN: Samia Ge M.D. OPERATING PHYSICIAN: Samia Ge M.D.    PATIENT ACCOUNT#:   [de-identified]    LOCATION:  [address] [place] [address]  MEDICAL RECORD #:   [id number]       DATE OF umbilical tape, re-evaluated the right leg. I estimated that I would have just enough vein if I came from the origin of the right SFA which was widely patent rather than dissect out the common femoral artery. Otherwise, I would have to use a prosthetic. appropriate for inflow, and soft and free of plaque. I then used a tunneler and tunneled between the 2 incisions. The patient was then systemically heparinized.   I then spatulated the distal end of the vein, and we then tightened the vessel loops on the excellent pulsatile flow through the vein graft. This was confirmed with Doppler. I then inspected the ankle, and there was an excellent peroneal signal at the ankle. We then reversed heparin with protamine.   I then packed off the right leg incisions wi

## 2021-07-28 PROBLEM — Z47.89 AFTERCARE FOLLOWING SURGERY OF THE MUSCULOSKELETAL SYSTEM: Status: ACTIVE | Noted: 2021-07-28

## 2021-08-26 NOTE — PROGRESS NOTES
Dressings removed   Palpable pulse in bypass graft  Incisions intact     Can shower  Will switch to lovenox injections  Patient can go home tomorrow on lovenox bridge if other services agree Additional Notes: The E/M service provided during this visit was medically necessary, significant, and independent from the procedure(s) provided on the same date of service and included documented elements above and beyond the usual pre-, intra-, and post-operative work associated with the procedure(s). Quality 226: Preventive Care And Screening: Tobacco Use: Screening And Cessation Intervention: Patient screened for tobacco use and is an ex/non-smoker Quality 130: Documentation Of Current Medications In The Medical Record: Current Medications Documented Quality 431: Preventive Care And Screening: Unhealthy Alcohol Use - Screening: Patient screened for unhealthy alcohol use using a single question and scores less than 2 times per year Detail Level: Detailed

## 2022-03-24 PROBLEM — I63.9 CVA (CEREBRAL VASCULAR ACCIDENT) (HCC): Status: ACTIVE | Noted: 2022-03-24

## 2022-12-02 NOTE — PAT NURSING NOTE
Patient recently started on Lovenox, [facility] office Vicente Inman) notified to see if patient needs to stop lovenox prior to procedure or if can continue taking.     BETO Pacheco RN

## 2022-12-06 ENCOUNTER — HOSPITAL ENCOUNTER (OUTPATIENT)
Dept: INTERVENTIONAL RADIOLOGY/VASCULAR | Facility: HOSPITAL | Age: 82
Discharge: HOME | End: 2022-12-06
Attending: SURGERY
Payer: MEDICARE

## 2022-12-09 RX ORDER — BUMETANIDE 0.5 MG/1
0.5 TABLET ORAL DAILY
COMMUNITY

## 2022-12-09 NOTE — PAT NURSING NOTE
Patient instructed from Dr. Ruslan Hess to stop Coumadin and take last dose on 12/8, however instructions from coumadin clinic were to take last dose of Coumadin on 12/9 and then bridge with Lovenox starting 12/11. Message left with Geraldine Krishnamurthy from [facility] vascular office to clarify instructions for patient. Per Dr. Ruslan Hess, ok for patient to follow instructions given by Coumadin Clinic for Coumadin last dose 12/9 and bridge with lovenox.     BETO Villanueva RN

## 2022-12-13 ENCOUNTER — HOSPITAL ENCOUNTER (OUTPATIENT)
Dept: INTERVENTIONAL RADIOLOGY/VASCULAR | Facility: HOSPITAL | Age: 82
Discharge: HOME | End: 2022-12-13
Attending: SURGERY | Admitting: SURGERY
Payer: MEDICARE

## 2022-12-13 VITALS
HEIGHT: 73 IN | HEART RATE: 72 BPM | SYSTOLIC BLOOD PRESSURE: 118 MMHG | BODY MASS INDEX: 23.19 KG/M2 | DIASTOLIC BLOOD PRESSURE: 75 MMHG | RESPIRATION RATE: 23 BRPM | OXYGEN SATURATION: 95 % | WEIGHT: 175 LBS | TEMPERATURE: 97 F

## 2022-12-13 DIAGNOSIS — Z98.890 S/P FEMORAL-TIBIAL BYPASS: ICD-10-CM

## 2022-12-13 LAB — INR: 1.4 (ref 0.8–1.3)

## 2022-12-13 PROCEDURE — 36246 INS CATH ABD/L-EXT ART 2ND: CPT | Performed by: SURGERY

## 2022-12-13 PROCEDURE — 75710 ARTERY X-RAYS ARM/LEG: CPT | Performed by: SURGERY

## 2022-12-13 PROCEDURE — 99152 MOD SED SAME PHYS/QHP 5/>YRS: CPT | Performed by: SURGERY

## 2022-12-13 PROCEDURE — 85610 PROTHROMBIN TIME: CPT | Performed by: SURGERY

## 2022-12-13 PROCEDURE — B41FYZZ FLUOROSCOPY OF RIGHT LOWER EXTREMITY ARTERIES USING OTHER CONTRAST: ICD-10-PCS | Performed by: SURGERY

## 2022-12-13 RX ORDER — MIDAZOLAM HYDROCHLORIDE 1 MG/ML
INJECTION INTRAMUSCULAR; INTRAVENOUS
Status: COMPLETED
Start: 2022-12-13 | End: 2022-12-13

## 2022-12-13 RX ORDER — HEPARIN SODIUM 5000 [USP'U]/ML
INJECTION, SOLUTION INTRAVENOUS; SUBCUTANEOUS
Status: COMPLETED
Start: 2022-12-13 | End: 2022-12-13

## 2022-12-13 RX ORDER — SODIUM CHLORIDE 9 MG/ML
INJECTION, SOLUTION INTRAVENOUS CONTINUOUS
Status: DISCONTINUED | OUTPATIENT
Start: 2022-12-13 | End: 2022-12-13

## 2022-12-13 RX ORDER — ONDANSETRON 2 MG/ML
4 INJECTION INTRAMUSCULAR; INTRAVENOUS EVERY 6 HOURS PRN
Status: DISCONTINUED | OUTPATIENT
Start: 2022-12-13 | End: 2022-12-13

## 2022-12-13 RX ORDER — ONDANSETRON 2 MG/ML
INJECTION INTRAMUSCULAR; INTRAVENOUS
Status: COMPLETED
Start: 2022-12-13 | End: 2022-12-13

## 2022-12-13 RX ORDER — IODIXANOL 320 MG/ML
100 INJECTION, SOLUTION INTRAVASCULAR
Status: COMPLETED | OUTPATIENT
Start: 2022-12-13 | End: 2022-12-13

## 2022-12-13 RX ORDER — LIDOCAINE HYDROCHLORIDE 10 MG/ML
INJECTION, SOLUTION EPIDURAL; INFILTRATION; INTRACAUDAL; PERINEURAL
Status: COMPLETED
Start: 2022-12-13 | End: 2022-12-13

## 2022-12-13 RX ADMIN — ONDANSETRON 4 MG: 2 INJECTION INTRAMUSCULAR; INTRAVENOUS at 14:15:00

## 2022-12-13 RX ADMIN — SODIUM CHLORIDE: 9 INJECTION, SOLUTION INTRAVENOUS at 11:33:00

## 2022-12-13 RX ADMIN — IODIXANOL 50 ML: 320 INJECTION, SOLUTION INTRAVASCULAR at 13:46:00

## 2022-12-13 NOTE — PROGRESS NOTES
Pt received s/p PTA with Dr. Titus Al. Left femoral arterial access site closed with perclose. Site CDI, no bleeding or hematoma present. Dr. Titus Al at bedside and spoke with pt. Pt feeling nauseous after returning from cath lab. Zofran dose given. Recovery completed. Discharge instructions given to pt and pt's wife. IV discontinued[facility]. Pt's wife driving pt home.

## 2022-12-13 NOTE — BRIEF OP NOTE
Pre-Operative Diagnosis: history of fem - peroneal bypass, possible peroneal artery stenosi     Post-Operative Diagnosis: history of fem -peroneal bypass - no stenosis     Procedure Performed:   1. US percutaneous access left common femoral artery  2. Selection of right common femoral artery and angiogram right leg      Anesthesia: 3 V 75 F, start 1315, finish 1337    Assistant(s):        Surgical Findings:   1.  Right common femoral artery, profunda patent, SFA patent, SFA - peroneal bypass patent, peroneal patent without stenosis     Specimen: none     Estimated Blood Loss: 50 mL  Jay Hansen MD  12/13/2022  1:39 PM

## 2022-12-23 NOTE — PROCEDURES
[facility]    PATIENT'S NAME: Al Score   ATTENDING PHYSICIAN: Eliceo Baires M.D. OPERATING PHYSICIAN: Eliceo Baires M.D. PATIENT ACCOUNT#:   [de-identified]    LOCATION:  [address]  MEDICAL RECORD #:   [id number]       YOB: 1940  ADMISSION DATE:       12/13/2022      OPERATION DATE:  12/13/2022    CARDIAC PROCEDURE TRANSCRIPTION    PERIPHERAL ANGIOGRAPHY    PREOPERATIVE DIAGNOSIS:  History of femoral-peroneal bypass, possible peroneal artery stenosis. POSTOPERATIVE DIAGNOSIS:  History of femoral-peroneal bypass with no stenosis of the peroneal artery. PROCEDURE PERFORMED:    1. Ultrasound-guided percutaneous access, left common femoral artery. 2.   Selection of right common femoral artery and angiogram, right leg. SEDATION:  Moderate conscious sedation with 3 mg of Versed and 75 mcg of fentanyl. Start time was 1315, finish was 1337 for a total of 22 minutes. ASSISTANT:  Catheterization lab staff. SURGICAL FINDINGS:  Right common femoral and profunda patent. SFA was patent. SFA to peroneal artery bypass was patent. Peroneal was patent without stenosis. SPECIMEN:  None. ESTIMATED BLOOD LOSS:  50 mL. BRIEF HISTORY:  This is an 80-year-old male with significant history of a previous femoral-peroneal bypass on surveillance. There was concern for a possible stenosis of a single outflow vessel, and we are proceeding with angiogram as described below. DETAILS OF PROCEDURE:  The patient was taken to the catheterization lab, prepped and draped in a sterile fashion. Moderate conscious sedation was initiated and monitored by a qualified nurse under my supervision. Using ultrasound, I percutaneously accessed the left common femoral artery with a micropuncture kit. I then advanced a Glidewire Advantage into the aorta and then exchanged the micropuncture sheath for a 5-Occitan sheath.   I then used a Crossover catheter and hooked the aortic bifurcation and then advanced the Glidewire and the Crossover down to the level of the right common femoral artery. From the position of the right common femoral artery, I did an angiogram of the right leg. The right common femoral artery and profunda were patent. The SFA was patent to the level of the bypass of the SFA to peroneal artery, and the bypass was widely patent. The peroneal artery distal to this was widely patent without any stenosis. Satisfied with this, we then removed all devices and placed a Perclose device. Patient was then taken to Recovery in stable condition.     Dictated By Vinicius Leblanc M.D.  d: 12/22/2022 12:23:41  t: 12/22/2022 16:37:18  [facility] [place] [id number]  MJG/

## 2023-09-04 ENCOUNTER — TELEPHONE (OUTPATIENT)
Dept: FAMILY MEDICINE | Age: 83
End: 2023-09-04

## 2025-04-09 NOTE — PHYSICAL THERAPY NOTE
PHYSICAL THERAPY TREATMENT NOTE - INPATIENT    Room Number: 4923/3166-U     Session: 1   Number of Visits to Meet Established Goals: 3    Presenting Problem: S/p R fem to peroneal artery bypass 7/6/21    Problem List  Active Problems:    Atherosclerosis o Procedure: METATARSOPHALANGEAL JOINT FUSION;  Surgeon: Bartolo King DPM;  Location: [address] [place] [address]   • INCISION FLEX TOE TENDON Left 5/12/2014    Procedure: ABDUCTOR HALLUCIS BREVIS TENDON TRANSFER/LENGTHENING;  Surgeon: Bartolo King DPM; Olivier Ontiveros  1/6/2012    Performed by Dominick Hampton at [address] Left 5/12/2014    Procedure: METATARSAL HEAD RESECTION;  Surgeon: Jez Brooks DPM;  Location: [address] [place] [address] CMS Modifier (G-Code): CJ    FUNCTIONAL ABILITY STATUS  Gait Assessment   Gait Assistance: Supervision  Distance (ft): 125, 200  Assistive Device: Rolling walker  Pattern: R Foot flat;L Foot flat  Stoop/Curb Assistance: Contact guard assist  Comment : As to help address above deficits. DISCHARGE RECOMMENDATIONS  PT Discharge Recommendations: Home (recommend use of rw at home)     PLAN  PT Treatment Plan: Bed mobility; Patient education;Gait training;Strengthening;Stair training;Transfer training;Balance Low Risk (score 7-11)

## (undated) DEVICE — SUTURE VICRYL 3-0 SH

## (undated) DEVICE — Device

## (undated) DEVICE — SOL  .9 1000ML BTL

## (undated) DEVICE — REM POLYHESIVE ADULT PATIENT RETURN ELECTRODE: Brand: VALLEYLAB

## (undated) DEVICE — ABSORBABLE HEMOSTAT (OXIDIZED REGENERATED CELLULOSE, U.S.P.): Brand: SURGICEL

## (undated) DEVICE — SPONGE LAP 18X18IN 7IN LOOP

## (undated) DEVICE — GOWN SURG AERO CHROME XXL

## (undated) DEVICE — HEMOCLIP MED 24 CLIP/CARTRIDGE

## (undated) DEVICE — GEL AQUASONIC 100 20GR

## (undated) DEVICE — GOWN,SIRUS,FABRIC-REINFORCED,X-LARGE: Brand: MEDLINE

## (undated) DEVICE — FLOSEAL HEMOSTATIC MATRIX, 5ML: Brand: FLOSEAL HEMOSTATIC MATRIX

## (undated) DEVICE — ZIMMER® STERILE DISPOSABLE TOURNIQUET CUFF WITH PLC, DUAL PORT, SINGLE BLADDER, 24 IN. (61 CM)

## (undated) DEVICE — STERILE ULTRASOUND GEL, SAFEWRAP: Brand: ECOVUE

## (undated) DEVICE — BLADE SAW KM33-11

## (undated) DEVICE — STERILE POLYISOPRENE POWDER-FREE SURGICAL GLOVES: Brand: PROTEXIS

## (undated) DEVICE — SUTURE PROLENE 5-0 C-1

## (undated) DEVICE — PADDING CAST SOFT ROLL 6\" STER

## (undated) DEVICE — TOWEL SURG OR 17X30IN BLUE

## (undated) DEVICE — HOOK LOCK LATEX FREE ELASTIC BANDAGE 4INX5YD

## (undated) DEVICE — INDICATED FOR SURGICAL CLAMPING DURING CARDIOVASCULAR PERIPHERAL VASCULAR, AND GENERAL SURGERY.: Brand: SOFT/FIBRA® SPRING CLIP

## (undated) DEVICE — GAUZE SPONGES,12 PLY: Brand: CURITY

## (undated) DEVICE — ESMARCH P/F TEXTURED 4" X 9' 10/BX

## (undated) DEVICE — SKIN AFFIX .4ML

## (undated) DEVICE — SUTURE PROLENE 6-0 C-1

## (undated) DEVICE — SUTURE SILK 2-0

## (undated) DEVICE — 450 ML BOTTLE OF 0.05% CHLORHEXIDINE GLUCONATE IN 99.95% STERILE WATER FOR IRRIGATION, USP AND APPLICATOR.: Brand: IRRISEPT ANTIMICROBIAL WOUND LAVAGE

## (undated) DEVICE — SUTURE SILK 3-0

## (undated) DEVICE — SOL  .9 500ML

## (undated) DEVICE — STANDARD HYPODERMIC NEEDLE,POLYPROPYLENE HUB: Brand: MONOJECT

## (undated) DEVICE — SUTURE PROLENE 7-0 BV-1

## (undated) DEVICE — CHLORAPREP 26ML APPLICATOR

## (undated) DEVICE — 3M™ IOBAN™ 2 ANTIMICROBIAL INCISE DRAPE 6651EZ: Brand: IOBAN™ 2

## (undated) DEVICE — TRANSPOSAL ULTRAFLEX DUO/QUAD ULTRA CART MANIFOLD

## (undated) DEVICE — SUTURE PROLENE 4-0 SH

## (undated) DEVICE — SUTURE VICRYL 3-0

## (undated) DEVICE — SUTURE VICRYL 2-0 CP-1

## (undated) DEVICE — BANDAGE ROLL,100% COTTON, 6 PLY, LARGE: Brand: KERLIX

## (undated) DEVICE — NON-ADHERENT STRIPS,OIL EMULSION: Brand: CURITY

## (undated) DEVICE — LOWER EXTREMITY CDS-LF: Brand: MEDLINE INDUSTRIES, INC.

## (undated) DEVICE — X-RAY DETECTABLE SPONGES,16 PLY: Brand: VISTEC

## (undated) DEVICE — SUTURE SILK 3-0 SH

## (undated) DEVICE — CV PACK-LF: Brand: MEDLINE INDUSTRIES, INC.

## (undated) DEVICE — PROXIMATE SKIN STAPLERS (35 WIDE) CONTAINS 35 STAINLESS STEEL STAPLES (FIXED HEAD): Brand: PROXIMATE

## (undated) DEVICE — 3M™ IOBAN™ 2 ANTIMICROBIAL INCISE DRAPE 6650EZ: Brand: IOBAN™ 2

## (undated) DEVICE — SUTURE ETHILON 3-0 PS-1

## (undated) DEVICE — ABDOMINAL PAD: Brand: DERMACEA

## (undated) DEVICE — INTENDED TO BE USED TO OCCLUDE, RETRACT AND IDENTIFY ARTERIES, VEINS, TENDONS AND NERVES IN SURGICAL PROCEDURES: Brand: STERION®  VESSEL LOOP

## (undated) NOTE — LETTER
Elvie Troy 89  [address] [facility] [address] [facility]  Authorization for Surgical Operation and Procedure     Date:___________                                                                                                         Time:__________ are some, but not all, of the potential risks that can occur: fever and allergic reactions, hemolytic reactions, transmission of diseases such as Hepatitis, AIDS and Cytomegalovirus (CMV) and fluid overload.   In the event that I wish to have an autologous surgeon or my attending physician will determine when the applicable recovery period ends for purposes of reinstating the DNAR order.   10. Patients having a sterilization procedure: I understand that if the procedure is successful the results will be perma Allow the anesthesiologist (anesthesia doctor) to give me medicine and do additional procedures as necessary.  Some examples are: Starting or using an “IV” to give me medicine, fluids or blood during my procedure, and having a breathing tube placed to help “epidural”, & “nerve blocks”): I understand that rare but potential complications include headache, bleeding, infection, seizure, irregular heart rhythms, and nerve injury.     I can change my mind about having anesthesia services at any time before I get

## (undated) NOTE — LETTER
Elvie Troy 182  [address] [facility] [address] [facility]  Authorization for Surgical Operation and Procedure     Date:___________                                                                                                         Time:__________ some, but not all, of the potential risks that can occur: fever and allergic reactions, hemolytic reactions, transmission of diseases such as Hepatitis, AIDS and Cytomegalovirus (CMV) and fluid overload.   In the event that I wish to have an autologous holt or my attending physician will determine when the applicable recovery period ends for purposes of reinstating the DNAR order.   10. Patients having a sterilization procedure: I understand that if the procedure is successful the results will be permanent and the anesthesiologist (anesthesia doctor) to give me medicine and do additional procedures as necessary.  Some examples are: Starting or using an “IV” to give me medicine, fluids or blood during my procedure, and having a breathing tube placed to help me adrienne “epidural”, & “nerve blocks”): I understand that rare but potential complications include headache, bleeding, infection, seizure, irregular heart rhythms, and nerve injury.     I can change my mind about having anesthesia services at any time before I get

## (undated) NOTE — LETTER
Elvie Troy 89  [address] [facility] [address] [facility]  Authorization for Surgical Operation and Procedure     Date:___________                                                                                                         Time:__________ some, but not all, of the potential risks that can occur: fever and allergic reactions, hemolytic reactions, transmission of diseases such as Hepatitis, AIDS and Cytomegalovirus (CMV) and fluid overload.   In the event that I wish to have an autologous holt or my attending physician will determine when the applicable recovery period ends for purposes of reinstating the DNAR order.   10. Patients having a sterilization procedure: I understand that if the procedure is successful the results will be permanent and the anesthesiologist (anesthesia doctor) to give me medicine and do additional procedures as necessary.  Some examples are: Starting or using an “IV” to give me medicine, fluids or blood during my procedure, and having a breathing tube placed to help me adrienne “epidural”, & “nerve blocks”): I understand that rare but potential complications include headache, bleeding, infection, seizure, irregular heart rhythms, and nerve injury.     I can change my mind about having anesthesia services at any time before I get

## (undated) NOTE — LETTER
Elvie Troy 89  [address] [facility] [address], [address] [facility]  Authorization for Surgical Operation and Procedure     Date:___________                                                                                                         Time:__________ some, but not all, of the potential risks that can occur: fever and allergic reactions, hemolytic reactions, transmission of diseases such as Hepatitis, AIDS and Cytomegalovirus (CMV) and fluid overload.   In the event that I wish to have an autologous holt or my attending physician will determine when the applicable recovery period ends for purposes of reinstating the DNAR order.   10. Patients having a sterilization procedure: I understand that if the procedure is successful the results will be permanent and the anesthesiologist (anesthesia doctor) to give me medicine and do additional procedures as necessary.  Some examples are: Starting or using an “IV” to give me medicine, fluids or blood during my procedure, and having a breathing tube placed to help me adrienne “epidural”, & “nerve blocks”): I understand that rare but potential complications include headache, bleeding, infection, seizure, irregular heart rhythms, and nerve injury.     I can change my mind about having anesthesia services at any time before I get

## (undated) NOTE — LETTER
Elvie Troy 89  [address] [facility] [address], [address] [facility]  Authorization for Surgical Operation and Procedure     Date:___________                                                                                                         Time:__________ some, but not all, of the potential risks that can occur: fever and allergic reactions, hemolytic reactions, transmission of diseases such as Hepatitis, AIDS and Cytomegalovirus (CMV) and fluid overload.   In the event that I wish to have an autologous holt or my attending physician will determine when the applicable recovery period ends for purposes of reinstating the DNAR order.   10. Patients having a sterilization procedure: I understand that if the procedure is successful the results will be permanent and the anesthesiologist (anesthesia doctor) to give me medicine and do additional procedures as necessary.  Some examples are: Starting or using an “IV” to give me medicine, fluids or blood during my procedure, and having a breathing tube placed to help me adrienne “epidural”, & “nerve blocks”): I understand that rare but potential complications include headache, bleeding, infection, seizure, irregular heart rhythms, and nerve injury.     I can change my mind about having anesthesia services at any time before I get